# Patient Record
Sex: FEMALE | Race: WHITE | Employment: UNEMPLOYED | URBAN - METROPOLITAN AREA
[De-identification: names, ages, dates, MRNs, and addresses within clinical notes are randomized per-mention and may not be internally consistent; named-entity substitution may affect disease eponyms.]

---

## 2020-06-25 ENCOUNTER — TELEPHONE (OUTPATIENT)
Dept: GYNECOLOGY | Age: 25
End: 2020-06-25

## 2020-06-25 ENCOUNTER — APPOINTMENT (OUTPATIENT)
Dept: ULTRASOUND IMAGING | Age: 25
End: 2020-06-25
Payer: MEDICAID

## 2020-06-25 ENCOUNTER — APPOINTMENT (OUTPATIENT)
Dept: GENERAL RADIOLOGY | Age: 25
End: 2020-06-25
Payer: MEDICAID

## 2020-06-25 ENCOUNTER — HOSPITAL ENCOUNTER (OUTPATIENT)
Age: 25
Setting detail: OBSERVATION
Discharge: LEFT BEFORE DISCHARGE | End: 2020-06-25
Attending: EMERGENCY MEDICINE | Admitting: PEDIATRICS
Payer: MEDICAID

## 2020-06-25 VITALS
DIASTOLIC BLOOD PRESSURE: 56 MMHG | HEIGHT: 65 IN | SYSTOLIC BLOOD PRESSURE: 104 MMHG | WEIGHT: 167.11 LBS | OXYGEN SATURATION: 97 % | BODY MASS INDEX: 27.84 KG/M2 | TEMPERATURE: 98.1 F | RESPIRATION RATE: 12 BRPM | HEART RATE: 79 BPM

## 2020-06-25 PROBLEM — R11.2 INTRACTABLE VOMITING WITH NAUSEA: Status: ACTIVE | Noted: 2020-06-25

## 2020-06-25 LAB
A/G RATIO: 1.3 (ref 1.1–2.2)
ACETAMINOPHEN LEVEL: <5 UG/ML (ref 10–30)
ALBUMIN SERPL-MCNC: 3.9 G/DL (ref 3.4–5)
ALP BLD-CCNC: 116 U/L (ref 40–129)
ALT SERPL-CCNC: 162 U/L (ref 10–40)
AMMONIA: 45 UMOL/L (ref 11–51)
AMPHETAMINE SCREEN, URINE: ABNORMAL
ANION GAP SERPL CALCULATED.3IONS-SCNC: 16 MMOL/L (ref 3–16)
AST SERPL-CCNC: 81 U/L (ref 15–37)
BACTERIA WET PREP: ABNORMAL
BACTERIA: ABNORMAL /HPF
BARBITURATE SCREEN URINE: ABNORMAL
BASOPHILS ABSOLUTE: 0.1 K/UL (ref 0–0.2)
BASOPHILS RELATIVE PERCENT: 0.6 %
BENZODIAZEPINE SCREEN, URINE: POSITIVE
BILIRUB SERPL-MCNC: 1.8 MG/DL (ref 0–1)
BILIRUBIN DIRECT: 0.7 MG/DL (ref 0–0.3)
BILIRUBIN URINE: ABNORMAL
BLOOD, URINE: NEGATIVE
BUN BLDV-MCNC: 6 MG/DL (ref 7–20)
CALCIUM SERPL-MCNC: 9.6 MG/DL (ref 8.3–10.6)
CANNABINOID SCREEN URINE: ABNORMAL
CHLORIDE BLD-SCNC: 85 MMOL/L (ref 99–110)
CLARITY: ABNORMAL
CLUE CELLS: ABNORMAL
CO2: 27 MMOL/L (ref 21–32)
COCAINE METABOLITE SCREEN URINE: ABNORMAL
COLOR: ABNORMAL
COMMENT UA: ABNORMAL
CREAT SERPL-MCNC: <0.5 MG/DL (ref 0.6–1.1)
EKG ATRIAL RATE: 89 BPM
EKG DIAGNOSIS: NORMAL
EKG P AXIS: 62 DEGREES
EKG P-R INTERVAL: 138 MS
EKG Q-T INTERVAL: 416 MS
EKG QRS DURATION: 88 MS
EKG QTC CALCULATION (BAZETT): 506 MS
EKG R AXIS: 70 DEGREES
EKG T AXIS: 33 DEGREES
EKG VENTRICULAR RATE: 89 BPM
EOSINOPHILS ABSOLUTE: 0 K/UL (ref 0–0.6)
EOSINOPHILS RELATIVE PERCENT: 0.3 %
EPITHELIAL CELLS WET PREP: ABNORMAL
EPITHELIAL CELLS, UA: 4 /HPF (ref 0–5)
ETHANOL: NORMAL MG/DL (ref 0–0.08)
GFR AFRICAN AMERICAN: >60
GFR NON-AFRICAN AMERICAN: >60
GLOBULIN: 3.1 G/DL
GLUCOSE BLD-MCNC: 136 MG/DL (ref 70–99)
GLUCOSE URINE: 100 MG/DL
GONADOTROPIN, CHORIONIC (HCG) QUANT: NORMAL MIU/ML
HCG QUALITATIVE: POSITIVE
HCT VFR BLD CALC: 39.3 % (ref 36–48)
HEMOGLOBIN: 14.1 G/DL (ref 12–16)
HYALINE CASTS: ABNORMAL /LPF (ref 0–2)
KETONES, URINE: 15 MG/DL
LACTIC ACID: 2.4 MMOL/L (ref 0.4–2)
LEUKOCYTE ESTERASE, URINE: ABNORMAL
LIPASE: 13 U/L (ref 13–60)
LYMPHOCYTES ABSOLUTE: 1.7 K/UL (ref 1–5.1)
LYMPHOCYTES RELATIVE PERCENT: 18.1 %
Lab: ABNORMAL
MAGNESIUM: 1.9 MG/DL (ref 1.8–2.4)
MCH RBC QN AUTO: 30.4 PG (ref 26–34)
MCHC RBC AUTO-ENTMCNC: 36 G/DL (ref 31–36)
MCV RBC AUTO: 84.6 FL (ref 80–100)
METHADONE SCREEN, URINE: ABNORMAL
MICROSCOPIC EXAMINATION: YES
MONOCYTES ABSOLUTE: 0.8 K/UL (ref 0–1.3)
MONOCYTES RELATIVE PERCENT: 8.3 %
MUCUS: ABNORMAL /LPF
NEUTROPHILS ABSOLUTE: 6.7 K/UL (ref 1.7–7.7)
NEUTROPHILS RELATIVE PERCENT: 72.7 %
NITRITE, URINE: POSITIVE
OPIATE SCREEN URINE: ABNORMAL
OXYCODONE URINE: ABNORMAL
PDW BLD-RTO: 15 % (ref 12.4–15.4)
PH UA: 6
PH UA: 6 (ref 5–8)
PHENCYCLIDINE SCREEN URINE: ABNORMAL
PHOSPHORUS: 3.2 MG/DL (ref 2.5–4.9)
PLATELET # BLD: 234 K/UL (ref 135–450)
PMV BLD AUTO: 8.9 FL (ref 5–10.5)
POTASSIUM REFLEX MAGNESIUM: 2.4 MMOL/L (ref 3.5–5.1)
PROPOXYPHENE SCREEN: ABNORMAL
PROTEIN UA: 100 MG/DL
RBC # BLD: 4.64 M/UL (ref 4–5.2)
RBC UA: 2 /HPF (ref 0–4)
RBC WET PREP: ABNORMAL
SALICYLATE, SERUM: <0.3 MG/DL (ref 15–30)
SODIUM BLD-SCNC: 128 MMOL/L (ref 136–145)
SOURCE WET PREP: ABNORMAL
SPECIFIC GRAVITY UA: >1.03 (ref 1–1.03)
TOTAL PROTEIN: 7 G/DL (ref 6.4–8.2)
TRICHOMONAS PREP: ABNORMAL
URINE TYPE: ABNORMAL
UROBILINOGEN, URINE: 4 E.U./DL
WBC # BLD: 9.2 K/UL (ref 4–11)
WBC UA: 56 /HPF (ref 0–5)
WBC WET PREP: ABNORMAL
YEAST WET PREP: ABNORMAL

## 2020-06-25 PROCEDURE — 82140 ASSAY OF AMMONIA: CPT

## 2020-06-25 PROCEDURE — 6360000002 HC RX W HCPCS: Performed by: OBSTETRICS & GYNECOLOGY

## 2020-06-25 PROCEDURE — G0378 HOSPITAL OBSERVATION PER HR: HCPCS

## 2020-06-25 PROCEDURE — 83690 ASSAY OF LIPASE: CPT

## 2020-06-25 PROCEDURE — 80053 COMPREHEN METABOLIC PANEL: CPT

## 2020-06-25 PROCEDURE — 83735 ASSAY OF MAGNESIUM: CPT

## 2020-06-25 PROCEDURE — 2580000003 HC RX 258: Performed by: OBSTETRICS & GYNECOLOGY

## 2020-06-25 PROCEDURE — 87491 CHLMYD TRACH DNA AMP PROBE: CPT

## 2020-06-25 PROCEDURE — 2580000003 HC RX 258: Performed by: EMERGENCY MEDICINE

## 2020-06-25 PROCEDURE — 84703 CHORIONIC GONADOTROPIN ASSAY: CPT

## 2020-06-25 PROCEDURE — G0480 DRUG TEST DEF 1-7 CLASSES: HCPCS

## 2020-06-25 PROCEDURE — 2580000003 HC RX 258: Performed by: HOSPITALIST

## 2020-06-25 PROCEDURE — 2500000003 HC RX 250 WO HCPCS: Performed by: OBSTETRICS & GYNECOLOGY

## 2020-06-25 PROCEDURE — 87040 BLOOD CULTURE FOR BACTERIA: CPT

## 2020-06-25 PROCEDURE — 6360000002 HC RX W HCPCS: Performed by: HOSPITALIST

## 2020-06-25 PROCEDURE — 6360000002 HC RX W HCPCS: Performed by: PEDIATRICS

## 2020-06-25 PROCEDURE — 96365 THER/PROPH/DIAG IV INF INIT: CPT

## 2020-06-25 PROCEDURE — 99221 1ST HOSP IP/OBS SF/LOW 40: CPT | Performed by: OBSTETRICS & GYNECOLOGY

## 2020-06-25 PROCEDURE — 6360000002 HC RX W HCPCS: Performed by: EMERGENCY MEDICINE

## 2020-06-25 PROCEDURE — 2500000003 HC RX 250 WO HCPCS: Performed by: EMERGENCY MEDICINE

## 2020-06-25 PROCEDURE — 84702 CHORIONIC GONADOTROPIN TEST: CPT

## 2020-06-25 PROCEDURE — 71045 X-RAY EXAM CHEST 1 VIEW: CPT

## 2020-06-25 PROCEDURE — 84100 ASSAY OF PHOSPHORUS: CPT

## 2020-06-25 PROCEDURE — 99285 EMERGENCY DEPT VISIT HI MDM: CPT

## 2020-06-25 PROCEDURE — 96375 TX/PRO/DX INJ NEW DRUG ADDON: CPT

## 2020-06-25 PROCEDURE — 80074 ACUTE HEPATITIS PANEL: CPT

## 2020-06-25 PROCEDURE — 85025 COMPLETE CBC W/AUTO DIFF WBC: CPT

## 2020-06-25 PROCEDURE — 96367 TX/PROPH/DG ADDL SEQ IV INF: CPT

## 2020-06-25 PROCEDURE — 87210 SMEAR WET MOUNT SALINE/INK: CPT

## 2020-06-25 PROCEDURE — 94760 N-INVAS EAR/PLS OXIMETRY 1: CPT

## 2020-06-25 PROCEDURE — 96361 HYDRATE IV INFUSION ADD-ON: CPT

## 2020-06-25 PROCEDURE — 80307 DRUG TEST PRSMV CHEM ANLYZR: CPT

## 2020-06-25 PROCEDURE — 93005 ELECTROCARDIOGRAM TRACING: CPT | Performed by: EMERGENCY MEDICINE

## 2020-06-25 PROCEDURE — 36415 COLL VENOUS BLD VENIPUNCTURE: CPT

## 2020-06-25 PROCEDURE — 81001 URINALYSIS AUTO W/SCOPE: CPT

## 2020-06-25 PROCEDURE — 93010 ELECTROCARDIOGRAM REPORT: CPT | Performed by: INTERNAL MEDICINE

## 2020-06-25 PROCEDURE — 83605 ASSAY OF LACTIC ACID: CPT

## 2020-06-25 PROCEDURE — 87591 N.GONORRHOEAE DNA AMP PROB: CPT

## 2020-06-25 PROCEDURE — 76801 OB US < 14 WKS SINGLE FETUS: CPT

## 2020-06-25 PROCEDURE — 82248 BILIRUBIN DIRECT: CPT

## 2020-06-25 RX ORDER — ONDANSETRON 2 MG/ML
4 INJECTION INTRAMUSCULAR; INTRAVENOUS EVERY 6 HOURS PRN
Status: DISCONTINUED | OUTPATIENT
Start: 2020-06-25 | End: 2020-06-25 | Stop reason: HOSPADM

## 2020-06-25 RX ORDER — POTASSIUM CHLORIDE 7.45 MG/ML
10 INJECTION INTRAVENOUS ONCE
Status: COMPLETED | OUTPATIENT
Start: 2020-06-25 | End: 2020-06-25

## 2020-06-25 RX ORDER — 0.9 % SODIUM CHLORIDE 0.9 %
1500 INTRAVENOUS SOLUTION INTRAVENOUS ONCE
Status: COMPLETED | OUTPATIENT
Start: 2020-06-25 | End: 2020-06-25

## 2020-06-25 RX ORDER — ACETAMINOPHEN 325 MG/1
650 TABLET ORAL EVERY 6 HOURS PRN
Status: DISCONTINUED | OUTPATIENT
Start: 2020-06-25 | End: 2020-06-25 | Stop reason: HOSPADM

## 2020-06-25 RX ORDER — ONDANSETRON 4 MG/1
4 TABLET, ORALLY DISINTEGRATING ORAL EVERY 8 HOURS PRN
Status: DISCONTINUED | OUTPATIENT
Start: 2020-06-25 | End: 2020-06-25 | Stop reason: HOSPADM

## 2020-06-25 RX ORDER — SODIUM CHLORIDE AND POTASSIUM CHLORIDE .9; .15 G/100ML; G/100ML
SOLUTION INTRAVENOUS CONTINUOUS
Status: DISCONTINUED | OUTPATIENT
Start: 2020-06-25 | End: 2020-06-25 | Stop reason: HOSPADM

## 2020-06-25 RX ORDER — POTASSIUM CHLORIDE 7.45 MG/ML
10 INJECTION INTRAVENOUS PRN
Status: DISCONTINUED | OUTPATIENT
Start: 2020-06-25 | End: 2020-06-25 | Stop reason: HOSPADM

## 2020-06-25 RX ORDER — 0.9 % SODIUM CHLORIDE 0.9 %
1000 INTRAVENOUS SOLUTION INTRAVENOUS ONCE
Status: COMPLETED | OUTPATIENT
Start: 2020-06-25 | End: 2020-06-25

## 2020-06-25 RX ORDER — MAGNESIUM SULFATE IN WATER 40 MG/ML
2 INJECTION, SOLUTION INTRAVENOUS PRN
Status: DISCONTINUED | OUTPATIENT
Start: 2020-06-25 | End: 2020-06-25 | Stop reason: HOSPADM

## 2020-06-25 RX ORDER — SODIUM CHLORIDE 0.9 % (FLUSH) 0.9 %
10 SYRINGE (ML) INJECTION PRN
Status: DISCONTINUED | OUTPATIENT
Start: 2020-06-25 | End: 2020-06-25 | Stop reason: HOSPADM

## 2020-06-25 RX ORDER — POLYETHYLENE GLYCOL 3350 17 G/17G
17 POWDER, FOR SOLUTION ORAL DAILY PRN
Status: DISCONTINUED | OUTPATIENT
Start: 2020-06-25 | End: 2020-06-25 | Stop reason: HOSPADM

## 2020-06-25 RX ORDER — SODIUM CHLORIDE 0.9 % (FLUSH) 0.9 %
10 SYRINGE (ML) INJECTION EVERY 12 HOURS SCHEDULED
Status: DISCONTINUED | OUTPATIENT
Start: 2020-06-25 | End: 2020-06-25 | Stop reason: HOSPADM

## 2020-06-25 RX ORDER — POTASSIUM CHLORIDE 20 MEQ/1
40 TABLET, EXTENDED RELEASE ORAL PRN
Status: DISCONTINUED | OUTPATIENT
Start: 2020-06-25 | End: 2020-06-25 | Stop reason: HOSPADM

## 2020-06-25 RX ORDER — ACETAMINOPHEN 650 MG/1
650 SUPPOSITORY RECTAL EVERY 6 HOURS PRN
Status: DISCONTINUED | OUTPATIENT
Start: 2020-06-25 | End: 2020-06-25 | Stop reason: HOSPADM

## 2020-06-25 RX ORDER — ONDANSETRON 2 MG/ML
4 INJECTION INTRAMUSCULAR; INTRAVENOUS ONCE
Status: COMPLETED | OUTPATIENT
Start: 2020-06-25 | End: 2020-06-25

## 2020-06-25 RX ADMIN — POTASSIUM CHLORIDE AND SODIUM CHLORIDE: 900; 150 INJECTION, SOLUTION INTRAVENOUS at 10:03

## 2020-06-25 RX ADMIN — CEFTRIAXONE 1 G: 1 INJECTION, POWDER, FOR SOLUTION INTRAMUSCULAR; INTRAVENOUS at 16:51

## 2020-06-25 RX ADMIN — FAMOTIDINE 20 MG: 10 INJECTION, SOLUTION INTRAVENOUS at 07:41

## 2020-06-25 RX ADMIN — SODIUM CHLORIDE 1000 ML: 9 INJECTION, SOLUTION INTRAVENOUS at 05:32

## 2020-06-25 RX ADMIN — POTASSIUM CHLORIDE 10 MEQ: 7.46 INJECTION, SOLUTION INTRAVENOUS at 05:56

## 2020-06-25 RX ADMIN — ONDANSETRON 4 MG: 2 INJECTION INTRAMUSCULAR; INTRAVENOUS at 05:48

## 2020-06-25 RX ADMIN — ASCORBIC ACID, VITAMIN A PALMITATE, CHOLECALCIFEROL, THIAMINE HYDROCHLORIDE, RIBOFLAVIN-5 PHOSPHATE SODIUM, PYRIDOXINE HYDROCHLORIDE, NIACINAMIDE, DEXPANTHENOL, ALPHA-TOCOPHEROL ACETATE, VITAMIN K1, FOLIC ACID, BIOTIN, CYANOCOBALAMIN: 200; 3300; 200; 6; 3.6; 6; 40; 15; 10; 150; 600; 60; 5 INJECTION, SOLUTION INTRAVENOUS at 19:03

## 2020-06-25 RX ADMIN — SODIUM CHLORIDE 1500 ML: 9 INJECTION, SOLUTION INTRAVENOUS at 05:33

## 2020-06-25 NOTE — CARE COORDINATION
INITIAL CASE MANAGEMENT ASSESSMENT    Reviewed chart, met with patient to assess possible discharge needs. Explained Case Management role/services. Living Situation: Patient lives with her step mother Krystyna Saravia. Confirmed home address    ADLs: Independent but reports weakness due to feeling ill     DME: None used    PT/OT Recs: none ordered     Active Services: None     Transportation: Has not driven recently. Reports step mother will transport home at discharge. Medications: No barriers to taking/obtaining medications. PCP: None. List of PCPs and community clinics given. HD/PD: N/A    Substance Use: Substance use discussed with patient. She reports she has not been active with any treatment recently but is interested in getting back on suboxone. Offered list of local treatment centers but denied need. She reports she has a list and is familiar with locations. Continued to deny SW assistance. PLAN/COMMENTS: Denied treatment resources. PCP/clinic list given. OB consulted for pregnancy    SW/CM provided contact information for patient or family to call with any questions. SW/CM will follow and assist as needed.     Electronically signed by ROBERT Mejía on 6/25/2020 at 1:57 PM

## 2020-06-25 NOTE — ED PROVIDER NOTES
Emergency Physician Note    Chief Complaint  Fatigue (x 1 month, not much intake of food, slow to respond) and Hematemesis ( x 3-4 days)       History of Present Illness  Adeola Laguna is a 23 y.o. female who presents to the ED for Lucille hematic emesis. Patient states for the past month \"stomach issues\", when I asked her what it is about she says because she has not taken any food for such a long time her stomach feels like it is always in hunger pains and it makes her want her eat less. Patient is a daily heroin user, she snorts it only has not been using IV drug use. Mother went to see her today saw that she was very slow to respond and fatigued and therefore brought her to the ER for further evaluation. Patient also reports for the past 3 or 4 days she has had multiple episodes of vomiting in the last few vomiting episodes had streaks of blood in it. She denies any chest pain or throat pain. Denies fever, chills, malaise, chest pain, shortness of breath, cough, diarrhea, headache, sore throat, dysuria, back pain, rash. No palliative/provocative factors. Unless otherwise stated in this report or unable to obtain because of the patient's clinical or mental status as evidenced by the medical record, this patient's positive and negative responses for review of systems, constitutional, psych, eyes, ENT, cardiovascular, respiratory, gastrointestinal, neurological, genitourinary, musculoskeletal, integument systems and systems related to the presenting problem are either stated in the preceding paragraph or were not pertinent or were negative for the symptoms and/or complaints related to the medical problem. I have reviewed the following from the nursing documentation:      Prior to Admission medications    Not on File       Allergies as of 06/25/2020    (Not on File)       No past medical history on file. Surgical History: No past surgical history on file.      Family History:  No family exudates,   no airway obstruction, moist mucous membranes. Uvula was midline and has symmetric rise. EYES:    Conjunctiva mild scleral icterus,   Pupils equal round and reactive to light,   Extraocular movements normal.  NECK:    Trachea is midline. No stridor. CHEST:    Regular rate and regular rhythm, no murmurs/rubs/gallops,   normal S1/S2, chest wall non-tender. LUNGS:    No respiratory distress. No abdominal retractions, no sternal retractions. Clear to auscultation bilaterally, no wheezing, no rhochi, no rales  ABDOMEN:    Soft, diffuse abdominal tenderness, distended. No guarding. No rebound. No costovertebral angle tenderness to palpation. Normal BS, no organomegaly, no abdominal masses  EXTREMITIES:   Moves extremities x4 with purpose. Normal range of motion, no edema,   no tenderness, no deformity,   distal pulses present and equal bilaterally. SKIN:    Warm, dry and intact. NEUROLOGIC:  Normal mental status. Moving all extremities to command. Alert and oriented x4   Generalized weakness without focal motor deficit or gross sensory deficit. Normal speech. PSYCHIATRIC:  Not anxious,   normal mood and affect,   thoughts are linear and organized,   without delusions/hallucinations,   responds appropriately to questions    LABS and DIAGNOSTIC RESULTS  EKG  The Ekg interpreted by me shows  normal sinus rhythm with a rate of 89  Axis is   Normal  QTc is  Prolonged at 506 ms  Intervals and Durations are unremarkable. ST Segments: depression in  multiple  Delta waves, Brugada Syndrome, and Short [place] are not present. Prior EKG to compare with was not available. RADIOLOGY  X-RAYS:  I have reviewed radiologic plain film image(s). ALL OTHER NON-PLAIN FILM IMAGES SUCH AS CT, ULTRASOUND AND MRI HAVE BEEN READ BY THE RADIOLOGIST.   XR CHEST PORTABLE   Final Result   Negative chest.              Chest X-Ray    Interpreted by: Emergency Department Physician    View: Portable chest xray    Findings: Normal heart size, Normal lungs, Normal mediastinum, No infiltrates, No hemothorax, No pneumothorax    LABS  No results found for this visit on 06/25/20. PROCEDURES  ULTRASOUND - OB  Ultrasound probe was used to evaluate the fetus. There is positive fetal movement, anterior placenta, easily discernible all 4 extremities and easily able to visualize the feet and hands, fetus was very active within the wound, positive  bpm. Patient tolerated the procedure well. MEDICAL DECISION MAKING    Procedures/interventions/images ordered for this visit  Orders Placed This Encounter   Procedures    Culture, Blood 1    Culture, Blood 2    XR CHEST STANDARD (2 VW)    Urinalysis, reflex to microscopic    CBC Auto Differential    Comprehensive Metabolic Panel w/ Reflex to MG    Lipase    HCG Qualitative, Serum    Urine Drug Screen    Lactic Acid, Plasma    Ammonia    Ethanol    Acetaminophen level    Salicylate    Orthostatic blood pressure and pulse    Initiate Oxygen Therapy Protocol    EKG 12 lead    Saline lock IV       Medications ordered for this visit  Orders Placed This Encounter   Medications    0.9 % sodium chloride bolus    0.9 % sodium chloride IV bolus 1,500 mL       ED course notes for this visit  ED Course as of Jun 25 0548   Thu Jun 25, 2020   0547 I went back into the room and discussed the positive pregnancy test with the patient. She recalls her last menstrual period was in the beginning of April but she cannot give me more specific date than that. She states in regards to the abdominal pain she has had intermittent episodes of sharpness in the lower abdomen otherwise just feels like hunger pains. She also reports intermittent white discharge from her vagina but no vaginal bleeding. Patient is G3, P211 (a 3year-old child and one stillborn birth).     [SG]      ED Course User Index  [SG] Nichole King MD       I wore goggles, surgical 0.0 - 0.3 mg/dL   Microscopic Urinalysis   Result Value Ref Range    Hyaline Casts, UA 3-5 (A) 0 - 2 /LPF    Mucus, UA 2+ (A) None Seen /LPF    Bacteria, UA 1+ (A) None Seen /HPF    Urinalysis Comments see below     WBC, UA 56 (H) 0 - 5 /HPF    RBC, UA 2 0 - 4 /HPF    Epithelial Cells, UA 4 0 - 5 /HPF   Phosphorus   Result Value Ref Range    Phosphorus 3.2 2.5 - 4.9 mg/dL   EKG 12 lead   Result Value Ref Range    Ventricular Rate 89 BPM    Atrial Rate 89 BPM    P-R Interval 138 ms    QRS Duration 88 ms    Q-T Interval 416 ms    QTc Calculation (Bazett) 506 ms    P Axis 62 degrees    R Axis 70 degrees    T Axis 33 degrees    Diagnosis       Normal sinus rhythmST & T wave abnormality, consider inferolateral ischemiaBorderline QT intervalAbnormal ECGNo previous ECGs availableConfirmed by [facility] Serge GUERRA ([id number]) on 6/25/2020 8:48:03 AM       I spoke with Dr. Renee Hobbs. We thoroughly discussed the history, physical exam, laboratory and imaging studies, as well as, emergency department course. Based upon that discussion, we've decided to admit Hemanth Fitzpatrick for further observation and evaluation of Cate Isbell's hypokalemia, abnormal EKG, elevated LFTs. As I have deemed necessary from their history, physical, and studies, I have considered and evaluated Hemanth Fitzpatrick for the following diagnoses:     pneumonia, UTI, meningitis, Guillain-Barryton, Lambert-Eaton, myasthenia gravis, or Tick-Borne disease, acute coronary syndrome, pulmonary embolism, toxicity, shock, sepsis, unstable arrhythmia, hemodynamic or cardiopulmonary instability, severe anemia,  ACUTE APPENDICITIS, BOWEL OBSTRUCTION, CHOLECYSTITIS, DIVERTICULITIS, INCARCERATED HERNIA, PANCREATITIS,  PERFORATED BOWEL,  ectopic pregnancy      FINAL IMPRESSION  1. Hypokalemia    2. Abnormal EKG    3. QT prolongation    4. Lower abdominal pain    5. Pregnancy as incidental finding    6.  Heroin abuse affecting pregnancy in first trimester ([address]    7. Elevated LFTs Vitals:  Blood pressure 134/76, pulse 84, temperature 97.9 °F (36.6 °C), temperature source Oral, resp. rate 19, height 5' 5\" (1.651 m), weight 158 lb 11.7 oz (72 kg), SpO2 97 %. Disposition    Pt is in stable condition upon Admit to med/surg floor. Please note, critical care time was at least 25 minutes, obtaining history, conducting a physical exam, performing and monitoring interventions, ordering, collecting and interpreting tests, and establishing medical decision-making and discussion with the patient and/or family, specifically for management of the presenting complaint and symptoms initially, direct bedside care, reevaluation, review of records, and consultation. There was a high probability of clinically significant life-threatening deterioration in the patient's condition, which required my urgent intervention. This time does not include separately billable procedures. The note was completed using Dragon voice recognition transcription. Every effort was made to ensure accuracy; however, inadvertent transcription errors may be present despite my best efforts to edit errors.     Chun London MD  [address] [place] [address]        Chun London MD  06/26/20 1044

## 2020-06-25 NOTE — ED NOTES
Report called to Linda RN   To Room 9519  Cardiac monitor on during transfer     VSS, Afebrile   IV site is clean, dry and intact, Normal saline locked            [facility]  06/25/20 4752

## 2020-06-25 NOTE — H&P
Hospital Medicine History & Physical      PCP: No primary care provider on file. Date of Admission: 2020    Date of Service: Pt seen/examined on 2020    Chief Complaint: Fatigue, intractable vomiting      History Of Present Illness:    23 y.o. female who abuses heroin by snorting it presents with 1 month of intractable vomiting associated with fatigue and 3 to 4 days of blood-streaked emesis. Last known menstrual cycle was the end of April. She engages in unprotected sex. Denies IV drug use. States that she has been unable to keep anything down and has multiple episodes of emesis in a day. Reports urinary urgency with dysuria. Past Medical History:    Heroin abuse    Past Surgical History:        Medications Prior to Admission:   Prior to Admission medications    Not on File       Allergies:  Patient has no allergy information on record. Social History:      The patient currently lives at home with her mom and stepmom    TOBACCO: Rarely  ETOH: Occasional  DRUGS: Snorts heroin      Family History:      Positive as follows:    No family history on file. REVIEW OF SYSTEMS:   Pertinent positives as noted in the HPI. All other systems reviewed and negative. PHYSICAL EXAM PERFORMED:    /76   Pulse 84   Temp 97.9 °F (36.6 °C) (Oral)   Resp 19   Ht 5' 5\" (1.651 m)   Wt 158 lb 11.7 oz (72 kg)   SpO2 97%   BMI 26.41 kg/m²     General appearance:  No apparent distress, appears stated age and cooperative. HEENT:  Normal cephalic, atraumatic without obvious deformity. Pupils equal, round, and reactive to light. Extra ocular muscles intact. Conjunctivae/corneas clear. Dry mucous membranes  Neck: Supple, with full range of motion. No jugular venous distention. Trachea midline. Respiratory:  Normal respiratory effort. Clear to auscultation, bilaterally without Rales/Wheezes/Rhonchi.   Cardiovascular:  Regular rate and rhythm with normal S1/S2 without murmurs, rubs or

## 2020-06-26 LAB
C TRACH DNA GENITAL QL NAA+PROBE: NEGATIVE
HAV IGM SER IA-ACNC: ABNORMAL
HEPATITIS B CORE IGM ANTIBODY: ABNORMAL
HEPATITIS B SURFACE ANTIGEN INTERPRETATION: ABNORMAL
HEPATITIS C ANTIBODY INTERPRETATION: REACTIVE
N. GONORRHOEAE DNA: NEGATIVE

## 2020-06-26 NOTE — PROGRESS NOTES
Patient calls nurse to bedside and states her mother is having emergency surgery and she needs to leave immediately. RN notifies patient of risks of leaving up to and including permanent disability and death. Patient verbalizes understanding of returning for worsening signs and symptoms as well as seeking medical care for follow up as discussed with primary admitting team upon admission. IV removed. Charge Nurse at bedside and NP aware of patient status. Patient is AOx4 and in no acute distress at departure.

## 2020-06-26 NOTE — PROGRESS NOTES
Notified by Pt's nurse that she has signed out against medical advice    Pt has normal mental status and adequate capacity to make medical decisions. The risks have been explained to the patient, including worsening illness, chronic pain, permanent disability, and death. The benefits of admission have also been explained, including the availability and proximity of nurses, physicians, monitoring, diagnostic testing, and treatment. The patient was able to understand and state the risks and benefits of hospital admission. This was witnessed by the bedside nurse [facility]    The patient had the opportunity to ask questions about her medical condition. The patient was treated to the extent that she would allow, and knows that He/she may return for care at any time. Instructed patient to follow up with OB/GYN for further obstetrical care.     [address], APRN - CNP

## 2020-06-28 ASSESSMENT — ENCOUNTER SYMPTOMS: ABDOMINAL PAIN: 0

## 2020-06-28 NOTE — PROGRESS NOTES
file     Gets together: Not on file     Attends Worship service: Not on file     Active member of club or organization: Not on file     Attends meetings of clubs or organizations: Not on file     Relationship status: Not on file    Intimate partner violence     Fear of current or ex partner: Not on file     Emotionally abused: Not on file     Physically abused: Not on file     Forced sexual activity: Not on file   Other Topics Concern    Not on file   Social History Narrative    Not on file     No Known Allergies  No outpatient medications have been marked as taking for the 6/25/20 encounter [facility] Encounter). No family history on file.   BP (!) 104/56   Pulse 79   Temp 98.1 °F (36.7 °C) (Oral)   Resp 12   Ht 5' 5\" (1.651 m)   Wt 167 lb 1.7 oz (75.8 kg)   SpO2 97%   BMI 27.81 kg/m²     WDWN in NAD  A and O x 3  HEENT-EOMI, mmm  ABD-soft, NT, ND, no hsm  EXT-no c/c/e, no cords, NT  Neuro-grossly intact  Skin-warm and dry    , K  2.4    US OB LESS THAN 14 WEEKS SINGLE OR FIRST GESTATION   Status: Final result   Order Providers     Authorizing Billing   MD Leslie Oh MD          Signed by     Signed Date/Time  Phone Pager   Derrek Zelaya 6/27/2020 18:31 [phone]    Reading Providers     Read Date Phone Pager   Derrek Zelaya Jun 25, 2020 [phone]    Routing History     Priority Sent On From To Message Type    6/27/2020  6:34 PM Kiko, Swoh Incoming Radiology Results From [address] [place] [address] Radiology F/U Results    6/26/2020  2:05 PM Swoh Incoming Lab Results From Soft (Jono Regalado) Niki Cortez MD Results    6/26/2020 11:55 AM Swoh Incoming Lab Results From Soft (Artur Adt) Blayne Rooney Ed Provider Pool Results   Radiation Dose Estimates     No radiation information found for this patient   Findings     No data filed   Narrative   EXAMINATION:   FIRST TRIMESTER OBSTETRIC ULTRASOUND       6/25/2020       TECHNIQUE:   Transabdominal and transvaginal first trimester obstetric pelvic ultrasound   was performed with color Doppler flow evaluation.       COMPARISON:   None       HISTORY:   ORDERING SYSTEM PROVIDED HISTORY: pregnancy   TECHNOLOGIST PROVIDED HISTORY:   Reason for exam:->pregnancy       FINDINGS:   Uterus: Uterus measures 10.5 x 10.1 x 9.0 cm.       Gestational Sac(s):  Single normal appearing gestational sac.  No evidence of   subchorionic hemorrhage.       Yolk Sac:  Not visualized.       Fetal Pole:  Single fetal pole       Crown Rump Length:  5.8 cm.       Fetal Heart Rate:  153 beats per minute.       Right ovary: Right ovary measures 3.5 x 2.3 x 1.6 cm.  Doppler signal was   elicited from the right ovary.       Left ovary: Left ovary measures 2.6 x 2.7 x 1.8 cm.  Doppler signal was   elicited from the left ovary.       Free fluid: No free pelvic fluid is identified.       Measurements:       Estimated gestational age by current ultrasound: 12 weeks 3 days +/- 1 week.       Estimated gestational by LMP/prior ultrasound: 12 weeks 1 day.  Estimated   date of delivery as determined by LMP is 1/6/2021.       Estimated Due Date: Estimated date of delivery as determined ultrasound is   1/4/2021.           Impression   Single live intrauterine pregnancy with estimated gestational age as   determined by ultrasound of 12 weeks 3 days +/- 1 week.  Estimated date of   delivery as determined ultrasound is 1/4/2021.           Order History     Open Order Details   PACS Images      Show images for US OB LESS THAN 14 WEEKS SINGLE OR FIRST GESTATION   Results History Report     View Report   External Result Report     External Result Report   Existing Charges      Charge Line Charge Status Charge Trigger Charge Type   [id number] FETAL EVAL 1ST TRI SGL GEST Filed [facility] Billing Imaging end exam Technical   [id number], PREG UTER,FETAL & MAT,1ST TRIMEST Deleted Imaging result study Professional   Order Report      Order Details     Plan-patient is

## 2020-06-29 LAB
BLOOD CULTURE, ROUTINE: NORMAL
CULTURE, BLOOD 2: NORMAL

## 2020-12-07 ENCOUNTER — ANESTHESIA EVENT (OUTPATIENT)
Dept: LABOR AND DELIVERY | Age: 25
DRG: 540 | End: 2020-12-07
Payer: MEDICAID

## 2020-12-07 ENCOUNTER — HOSPITAL ENCOUNTER (INPATIENT)
Age: 25
LOS: 3 days | Discharge: OTHER INSTITUTION | DRG: 540 | End: 2020-12-10
Attending: OBSTETRICS & GYNECOLOGY | Admitting: OBSTETRICS & GYNECOLOGY
Payer: MEDICAID

## 2020-12-07 ENCOUNTER — ANESTHESIA (OUTPATIENT)
Dept: LABOR AND DELIVERY | Age: 25
DRG: 540 | End: 2020-12-07
Payer: MEDICAID

## 2020-12-07 VITALS
SYSTOLIC BLOOD PRESSURE: 122 MMHG | DIASTOLIC BLOOD PRESSURE: 74 MMHG | OXYGEN SATURATION: 100 % | RESPIRATION RATE: 27 BRPM

## 2020-12-07 PROBLEM — Z37.9 NORMAL LABOR: Status: ACTIVE | Noted: 2020-12-07

## 2020-12-07 LAB
ABO/RH: NORMAL
ABO/RH: NORMAL
AMPHETAMINE SCREEN, URINE: POSITIVE
ANION GAP SERPL CALCULATED.3IONS-SCNC: 12 MMOL/L (ref 3–16)
ANTIBODY SCREEN: NORMAL
BARBITURATE SCREEN URINE: ABNORMAL
BASOPHILS ABSOLUTE: 0 K/UL (ref 0–0.2)
BASOPHILS RELATIVE PERCENT: 0.2 %
BENZODIAZEPINE SCREEN, URINE: ABNORMAL
BILIRUBIN URINE: NEGATIVE
BLOOD, URINE: ABNORMAL
BUN BLDV-MCNC: 9 MG/DL (ref 7–20)
BUPRENORPHINE URINE: ABNORMAL
CALCIUM SERPL-MCNC: 8.8 MG/DL (ref 8.3–10.6)
CANNABINOID SCREEN URINE: ABNORMAL
CHLORIDE BLD-SCNC: 100 MMOL/L (ref 99–110)
CLARITY: ABNORMAL
CO2: 22 MMOL/L (ref 21–32)
COCAINE METABOLITE SCREEN URINE: ABNORMAL
COLOR: ABNORMAL
COMMENT UA: ABNORMAL
CREAT SERPL-MCNC: 0.6 MG/DL (ref 0.6–1.1)
EOSINOPHILS ABSOLUTE: 0 K/UL (ref 0–0.6)
EOSINOPHILS RELATIVE PERCENT: 0 %
EPITHELIAL CELLS, UA: 4 /HPF (ref 0–5)
FETAL SCREEN: NORMAL
GFR AFRICAN AMERICAN: >60
GFR NON-AFRICAN AMERICAN: >60
GLUCOSE BLD-MCNC: 136 MG/DL (ref 70–99)
GLUCOSE URINE: NEGATIVE MG/DL
HCT VFR BLD CALC: 37.5 % (ref 36–48)
HCT VFR BLD CALC: 39.4 % (ref 36–48)
HEMOGLOBIN: 12.6 G/DL (ref 12–16)
HEMOGLOBIN: 13.2 G/DL (ref 12–16)
HEPATITIS B SURFACE ANTIGEN INTERPRETATION: NORMAL
HEPATITIS C ANTIBODY INTERPRETATION: REACTIVE
HYALINE CASTS: 2 /LPF (ref 0–8)
KETONES, URINE: NEGATIVE MG/DL
LEUKOCYTE ESTERASE, URINE: ABNORMAL
LYMPHOCYTES ABSOLUTE: 0.7 K/UL (ref 1–5.1)
LYMPHOCYTES RELATIVE PERCENT: 4.3 %
Lab: ABNORMAL
MCH RBC QN AUTO: 30.1 PG (ref 26–34)
MCH RBC QN AUTO: 30.2 PG (ref 26–34)
MCHC RBC AUTO-ENTMCNC: 33.5 G/DL (ref 31–36)
MCHC RBC AUTO-ENTMCNC: 33.7 G/DL (ref 31–36)
MCV RBC AUTO: 89.5 FL (ref 80–100)
MCV RBC AUTO: 89.8 FL (ref 80–100)
METHADONE SCREEN, URINE: ABNORMAL
MICROSCOPIC EXAMINATION: YES
MONOCYTES ABSOLUTE: 0.3 K/UL (ref 0–1.3)
MONOCYTES RELATIVE PERCENT: 2.1 %
NEUTROPHILS ABSOLUTE: 14.1 K/UL (ref 1.7–7.7)
NEUTROPHILS RELATIVE PERCENT: 93.4 %
NITRITE, URINE: NEGATIVE
OPIATE SCREEN URINE: POSITIVE
OXYCODONE URINE: ABNORMAL
PDW BLD-RTO: 14.6 % (ref 12.4–15.4)
PDW BLD-RTO: 14.9 % (ref 12.4–15.4)
PH UA: 6
PH UA: 6 (ref 5–8)
PHENCYCLIDINE SCREEN URINE: ABNORMAL
PLATELET # BLD: 344 K/UL (ref 135–450)
PLATELET # BLD: 364 K/UL (ref 135–450)
PMV BLD AUTO: 8.8 FL (ref 5–10.5)
PMV BLD AUTO: 9.4 FL (ref 5–10.5)
POTASSIUM REFLEX MAGNESIUM: 3.9 MMOL/L (ref 3.5–5.1)
PROPOXYPHENE SCREEN: ABNORMAL
PROTEIN UA: ABNORMAL MG/DL
RAPID HIV 1&2: NORMAL
RBC # BLD: 4.19 M/UL (ref 4–5.2)
RBC # BLD: 4.39 M/UL (ref 4–5.2)
RBC UA: 38 /HPF (ref 0–4)
RHIG LOT NUMBER: NORMAL
RUBELLA ANTIBODY IGG: 37.1 IU/ML
SARS-COV-2, NAAT: NOT DETECTED
SODIUM BLD-SCNC: 134 MMOL/L (ref 136–145)
SPECIFIC GRAVITY UA: 1.02 (ref 1–1.03)
URINE TYPE: ABNORMAL
UROBILINOGEN, URINE: 1 E.U./DL
WBC # BLD: 15.1 K/UL (ref 4–11)
WBC # BLD: 17.2 K/UL (ref 4–11)
WBC UA: 10 /HPF (ref 0–5)

## 2020-12-07 PROCEDURE — 2500000003 HC RX 250 WO HCPCS: Performed by: NURSE ANESTHETIST, CERTIFIED REGISTERED

## 2020-12-07 PROCEDURE — 96375 TX/PRO/DX INJ NEW DRUG ADDON: CPT

## 2020-12-07 PROCEDURE — U0002 COVID-19 LAB TEST NON-CDC: HCPCS

## 2020-12-07 PROCEDURE — 96374 THER/PROPH/DIAG INJ IV PUSH: CPT

## 2020-12-07 PROCEDURE — 2580000003 HC RX 258: Performed by: NURSE ANESTHETIST, CERTIFIED REGISTERED

## 2020-12-07 PROCEDURE — 2500000003 HC RX 250 WO HCPCS: Performed by: OBSTETRICS & GYNECOLOGY

## 2020-12-07 PROCEDURE — 7100000000 HC PACU RECOVERY - FIRST 15 MIN: Performed by: OBSTETRICS & GYNECOLOGY

## 2020-12-07 PROCEDURE — 96372 THER/PROPH/DIAG INJ SC/IM: CPT

## 2020-12-07 PROCEDURE — 6360000002 HC RX W HCPCS: Performed by: OBSTETRICS & GYNECOLOGY

## 2020-12-07 PROCEDURE — 6360000002 HC RX W HCPCS: Performed by: NURSE ANESTHETIST, CERTIFIED REGISTERED

## 2020-12-07 PROCEDURE — 88307 TISSUE EXAM BY PATHOLOGIST: CPT

## 2020-12-07 PROCEDURE — 85027 COMPLETE CBC AUTOMATED: CPT

## 2020-12-07 PROCEDURE — 80048 BASIC METABOLIC PNL TOTAL CA: CPT

## 2020-12-07 PROCEDURE — 3700000001 HC ADD 15 MINUTES (ANESTHESIA): Performed by: OBSTETRICS & GYNECOLOGY

## 2020-12-07 PROCEDURE — 86803 HEPATITIS C AB TEST: CPT

## 2020-12-07 PROCEDURE — 86900 BLOOD TYPING SEROLOGIC ABO: CPT

## 2020-12-07 PROCEDURE — 80307 DRUG TEST PRSMV CHEM ANLYZR: CPT

## 2020-12-07 PROCEDURE — 1220000000 HC SEMI PRIVATE OB R&B

## 2020-12-07 PROCEDURE — 3700000000 HC ANESTHESIA ATTENDED CARE: Performed by: OBSTETRICS & GYNECOLOGY

## 2020-12-07 PROCEDURE — 86762 RUBELLA ANTIBODY: CPT

## 2020-12-07 PROCEDURE — 85461 HEMOGLOBIN FETAL: CPT

## 2020-12-07 PROCEDURE — 81001 URINALYSIS AUTO W/SCOPE: CPT

## 2020-12-07 PROCEDURE — 86780 TREPONEMA PALLIDUM: CPT

## 2020-12-07 PROCEDURE — 2580000003 HC RX 258: Performed by: OBSTETRICS & GYNECOLOGY

## 2020-12-07 PROCEDURE — 2709999900 HC NON-CHARGEABLE SUPPLY: Performed by: OBSTETRICS & GYNECOLOGY

## 2020-12-07 PROCEDURE — 86701 HIV-1ANTIBODY: CPT

## 2020-12-07 PROCEDURE — 3609079900 HC CESAREAN SECTION: Performed by: OBSTETRICS & GYNECOLOGY

## 2020-12-07 PROCEDURE — 86901 BLOOD TYPING SEROLOGIC RH(D): CPT

## 2020-12-07 PROCEDURE — 36415 COLL VENOUS BLD VENIPUNCTURE: CPT

## 2020-12-07 PROCEDURE — 85025 COMPLETE CBC W/AUTO DIFF WBC: CPT

## 2020-12-07 PROCEDURE — 86850 RBC ANTIBODY SCREEN: CPT

## 2020-12-07 PROCEDURE — 87340 HEPATITIS B SURFACE AG IA: CPT

## 2020-12-07 PROCEDURE — 7100000001 HC PACU RECOVERY - ADDTL 15 MIN: Performed by: OBSTETRICS & GYNECOLOGY

## 2020-12-07 RX ORDER — NALOXONE HYDROCHLORIDE 0.4 MG/ML
0.4 INJECTION, SOLUTION INTRAMUSCULAR; INTRAVENOUS; SUBCUTANEOUS PRN
Status: DISCONTINUED | OUTPATIENT
Start: 2020-12-07 | End: 2020-12-08

## 2020-12-07 RX ORDER — DOCUSATE SODIUM 100 MG/1
100 CAPSULE, LIQUID FILLED ORAL 2 TIMES DAILY
Status: CANCELLED | OUTPATIENT
Start: 2020-12-07

## 2020-12-07 RX ORDER — ROCURONIUM BROMIDE 10 MG/ML
INJECTION, SOLUTION INTRAVENOUS PRN
Status: DISCONTINUED | OUTPATIENT
Start: 2020-12-07 | End: 2020-12-07 | Stop reason: SDUPTHER

## 2020-12-07 RX ORDER — OXYCODONE HYDROCHLORIDE AND ACETAMINOPHEN 5; 325 MG/1; MG/1
2 TABLET ORAL EVERY 4 HOURS PRN
Status: DISCONTINUED | OUTPATIENT
Start: 2020-12-07 | End: 2020-12-08

## 2020-12-07 RX ORDER — MORPHINE SULFATE 4 MG/ML
4 INJECTION, SOLUTION INTRAMUSCULAR; INTRAVENOUS EVERY 4 HOURS PRN
Status: DISCONTINUED | OUTPATIENT
Start: 2020-12-07 | End: 2020-12-09

## 2020-12-07 RX ORDER — PROPOFOL 10 MG/ML
INJECTION, EMULSION INTRAVENOUS PRN
Status: DISCONTINUED | OUTPATIENT
Start: 2020-12-07 | End: 2020-12-07 | Stop reason: SDUPTHER

## 2020-12-07 RX ORDER — METHYLERGONOVINE MALEATE 0.2 MG/ML
200 INJECTION INTRAVENOUS PRN
Status: CANCELLED | OUTPATIENT
Start: 2020-12-07

## 2020-12-07 RX ORDER — MORPHINE SULFATE/0.9% NACL/PF 1 MG/ML
SYRINGE (ML) INJECTION CONTINUOUS
Status: CANCELLED | OUTPATIENT
Start: 2020-12-07

## 2020-12-07 RX ORDER — ACETAMINOPHEN 325 MG/1
650 TABLET ORAL EVERY 4 HOURS PRN
Status: DISCONTINUED | OUTPATIENT
Start: 2020-12-07 | End: 2020-12-08

## 2020-12-07 RX ORDER — LANOLIN 100 %
OINTMENT (GRAM) TOPICAL
Status: CANCELLED | OUTPATIENT
Start: 2020-12-07

## 2020-12-07 RX ORDER — SODIUM CHLORIDE 0.9 % (FLUSH) 0.9 %
10 SYRINGE (ML) INJECTION PRN
Status: CANCELLED | OUTPATIENT
Start: 2020-12-07

## 2020-12-07 RX ORDER — SODIUM CHLORIDE 0.9 % (FLUSH) 0.9 %
10 SYRINGE (ML) INJECTION PRN
Status: DISCONTINUED | OUTPATIENT
Start: 2020-12-07 | End: 2020-12-10 | Stop reason: HOSPADM

## 2020-12-07 RX ORDER — ONDANSETRON 2 MG/ML
4 INJECTION INTRAMUSCULAR; INTRAVENOUS EVERY 6 HOURS PRN
Status: DISCONTINUED | OUTPATIENT
Start: 2020-12-07 | End: 2020-12-10 | Stop reason: HOSPADM

## 2020-12-07 RX ORDER — NALOXONE HYDROCHLORIDE 0.4 MG/ML
0.4 INJECTION, SOLUTION INTRAMUSCULAR; INTRAVENOUS; SUBCUTANEOUS PRN
Status: CANCELLED | OUTPATIENT
Start: 2020-12-07

## 2020-12-07 RX ORDER — IBUPROFEN 400 MG/1
800 TABLET ORAL EVERY 8 HOURS PRN
Status: CANCELLED | OUTPATIENT
Start: 2020-12-07

## 2020-12-07 RX ORDER — SODIUM CHLORIDE, SODIUM LACTATE, POTASSIUM CHLORIDE, CALCIUM CHLORIDE 600; 310; 30; 20 MG/100ML; MG/100ML; MG/100ML; MG/100ML
INJECTION, SOLUTION INTRAVENOUS CONTINUOUS PRN
Status: DISCONTINUED | OUTPATIENT
Start: 2020-12-07 | End: 2020-12-07 | Stop reason: SDUPTHER

## 2020-12-07 RX ORDER — SODIUM CHLORIDE, SODIUM LACTATE, POTASSIUM CHLORIDE, CALCIUM CHLORIDE 600; 310; 30; 20 MG/100ML; MG/100ML; MG/100ML; MG/100ML
INJECTION, SOLUTION INTRAVENOUS CONTINUOUS
Status: DISCONTINUED | OUTPATIENT
Start: 2020-12-07 | End: 2020-12-10 | Stop reason: HOSPADM

## 2020-12-07 RX ORDER — ONDANSETRON 2 MG/ML
INJECTION INTRAMUSCULAR; INTRAVENOUS PRN
Status: DISCONTINUED | OUTPATIENT
Start: 2020-12-07 | End: 2020-12-07 | Stop reason: SDUPTHER

## 2020-12-07 RX ORDER — CLINDAMYCIN PHOSPHATE 900 MG/50ML
900 INJECTION INTRAVENOUS EVERY 8 HOURS
Status: COMPLETED | OUTPATIENT
Start: 2020-12-07 | End: 2020-12-08

## 2020-12-07 RX ORDER — FENTANYL CITRATE 50 UG/ML
INJECTION, SOLUTION INTRAMUSCULAR; INTRAVENOUS PRN
Status: DISCONTINUED | OUTPATIENT
Start: 2020-12-07 | End: 2020-12-07 | Stop reason: SDUPTHER

## 2020-12-07 RX ORDER — PRENATAL VIT/IRON FUM/FOLIC AC 27MG-0.8MG
1 TABLET ORAL DAILY
Status: CANCELLED | OUTPATIENT
Start: 2020-12-07

## 2020-12-07 RX ORDER — LANOLIN 100 %
OINTMENT (GRAM) TOPICAL
Status: DISCONTINUED | OUTPATIENT
Start: 2020-12-07 | End: 2020-12-10 | Stop reason: HOSPADM

## 2020-12-07 RX ORDER — LIDOCAINE HYDROCHLORIDE 20 MG/ML
INJECTION, SOLUTION INFILTRATION; PERINEURAL PRN
Status: DISCONTINUED | OUTPATIENT
Start: 2020-12-07 | End: 2020-12-07 | Stop reason: SDUPTHER

## 2020-12-07 RX ORDER — SODIUM CHLORIDE 0.9 % (FLUSH) 0.9 %
10 SYRINGE (ML) INJECTION EVERY 12 HOURS SCHEDULED
Status: CANCELLED | OUTPATIENT
Start: 2020-12-07

## 2020-12-07 RX ORDER — SIMETHICONE 80 MG
80 TABLET,CHEWABLE ORAL EVERY 6 HOURS PRN
Status: CANCELLED | OUTPATIENT
Start: 2020-12-07

## 2020-12-07 RX ORDER — SODIUM CHLORIDE, SODIUM LACTATE, POTASSIUM CHLORIDE, CALCIUM CHLORIDE 600; 310; 30; 20 MG/100ML; MG/100ML; MG/100ML; MG/100ML
INJECTION, SOLUTION INTRAVENOUS CONTINUOUS
Status: DISCONTINUED | OUTPATIENT
Start: 2020-12-07 | End: 2020-12-07 | Stop reason: SDUPTHER

## 2020-12-07 RX ORDER — TRISODIUM CITRATE DIHYDRATE AND CITRIC ACID MONOHYDRATE 500; 334 MG/5ML; MG/5ML
30 SOLUTION ORAL ONCE
Status: DISCONTINUED | OUTPATIENT
Start: 2020-12-07 | End: 2020-12-10 | Stop reason: HOSPADM

## 2020-12-07 RX ORDER — SODIUM CHLORIDE 0.9 % (FLUSH) 0.9 %
10 SYRINGE (ML) INJECTION PRN
Status: DISCONTINUED | OUTPATIENT
Start: 2020-12-07 | End: 2020-12-07 | Stop reason: SDUPTHER

## 2020-12-07 RX ORDER — PRENATAL VIT/IRON FUM/FOLIC AC 27MG-0.8MG
1 TABLET ORAL DAILY
Status: DISCONTINUED | OUTPATIENT
Start: 2020-12-07 | End: 2020-12-10 | Stop reason: HOSPADM

## 2020-12-07 RX ORDER — METOCLOPRAMIDE HYDROCHLORIDE 5 MG/ML
INJECTION INTRAMUSCULAR; INTRAVENOUS
Status: DISPENSED
Start: 2020-12-07 | End: 2020-12-07

## 2020-12-07 RX ORDER — MORPHINE SULFATE/0.9% NACL/PF 1 MG/ML
SYRINGE (ML) INJECTION CONTINUOUS
Status: DISCONTINUED | OUTPATIENT
Start: 2020-12-07 | End: 2020-12-08

## 2020-12-07 RX ORDER — METOCLOPRAMIDE HYDROCHLORIDE 5 MG/ML
10 INJECTION INTRAMUSCULAR; INTRAVENOUS ONCE
Status: DISCONTINUED | OUTPATIENT
Start: 2020-12-07 | End: 2020-12-10 | Stop reason: HOSPADM

## 2020-12-07 RX ORDER — TRISODIUM CITRATE DIHYDRATE AND CITRIC ACID MONOHYDRATE 500; 334 MG/5ML; MG/5ML
SOLUTION ORAL
Status: DISPENSED
Start: 2020-12-07 | End: 2020-12-07

## 2020-12-07 RX ORDER — DIPHENHYDRAMINE HYDROCHLORIDE 50 MG/ML
25 INJECTION INTRAMUSCULAR; INTRAVENOUS EVERY 6 HOURS PRN
Status: DISCONTINUED | OUTPATIENT
Start: 2020-12-07 | End: 2020-12-10 | Stop reason: HOSPADM

## 2020-12-07 RX ORDER — KETOROLAC TROMETHAMINE 30 MG/ML
30 INJECTION, SOLUTION INTRAMUSCULAR; INTRAVENOUS EVERY 6 HOURS
Status: COMPLETED | OUTPATIENT
Start: 2020-12-07 | End: 2020-12-07

## 2020-12-07 RX ORDER — NICOTINE 21 MG/24HR
1 PATCH, TRANSDERMAL 24 HOURS TRANSDERMAL DAILY
Status: CANCELLED | OUTPATIENT
Start: 2020-12-07

## 2020-12-07 RX ORDER — SUCCINYLCHOLINE/SOD CL,ISO/PF 200MG/10ML
SYRINGE (ML) INTRAVENOUS PRN
Status: DISCONTINUED | OUTPATIENT
Start: 2020-12-07 | End: 2020-12-07 | Stop reason: SDUPTHER

## 2020-12-07 RX ORDER — SODIUM CHLORIDE 0.9 % (FLUSH) 0.9 %
10 SYRINGE (ML) INJECTION EVERY 12 HOURS SCHEDULED
Status: DISCONTINUED | OUTPATIENT
Start: 2020-12-07 | End: 2020-12-10 | Stop reason: HOSPADM

## 2020-12-07 RX ORDER — SODIUM CHLORIDE 0.9 % (FLUSH) 0.9 %
10 SYRINGE (ML) INJECTION EVERY 12 HOURS SCHEDULED
Status: DISCONTINUED | OUTPATIENT
Start: 2020-12-07 | End: 2020-12-07 | Stop reason: SDUPTHER

## 2020-12-07 RX ORDER — OXYTOCIN 10 [USP'U]/ML
INJECTION, SOLUTION INTRAMUSCULAR; INTRAVENOUS PRN
Status: DISCONTINUED | OUTPATIENT
Start: 2020-12-07 | End: 2020-12-07 | Stop reason: SDUPTHER

## 2020-12-07 RX ORDER — SODIUM CHLORIDE, SODIUM LACTATE, POTASSIUM CHLORIDE, CALCIUM CHLORIDE 600; 310; 30; 20 MG/100ML; MG/100ML; MG/100ML; MG/100ML
1000 INJECTION, SOLUTION INTRAVENOUS ONCE
Status: DISCONTINUED | OUTPATIENT
Start: 2020-12-07 | End: 2020-12-10 | Stop reason: HOSPADM

## 2020-12-07 RX ORDER — DOCUSATE SODIUM 100 MG/1
100 CAPSULE, LIQUID FILLED ORAL 2 TIMES DAILY
Status: DISCONTINUED | OUTPATIENT
Start: 2020-12-07 | End: 2020-12-10 | Stop reason: HOSPADM

## 2020-12-07 RX ADMIN — CEFAZOLIN SODIUM 2 G: 10 INJECTION, POWDER, FOR SOLUTION INTRAVENOUS at 02:45

## 2020-12-07 RX ADMIN — SODIUM CHLORIDE, SODIUM LACTATE, POTASSIUM CHLORIDE, AND CALCIUM CHLORIDE: .6; .31; .03; .02 INJECTION, SOLUTION INTRAVENOUS at 03:14

## 2020-12-07 RX ADMIN — OXYTOCIN 20 UNITS: 10 INJECTION INTRAVENOUS at 03:01

## 2020-12-07 RX ADMIN — MORPHINE SULFATE: 1 INJECTION INTRAVENOUS at 05:45

## 2020-12-07 RX ADMIN — MORPHINE SULFATE 30 MG: 1 INJECTION INTRAVENOUS at 19:12

## 2020-12-07 RX ADMIN — OXYTOCIN 20 UNITS: 10 INJECTION INTRAVENOUS at 03:16

## 2020-12-07 RX ADMIN — SODIUM CHLORIDE, SODIUM LACTATE, POTASSIUM CHLORIDE, AND CALCIUM CHLORIDE: .6; .31; .03; .02 INJECTION, SOLUTION INTRAVENOUS at 02:44

## 2020-12-07 RX ADMIN — CLINDAMYCIN PHOSPHATE 900 MG: 900 INJECTION, SOLUTION INTRAVENOUS at 16:22

## 2020-12-07 RX ADMIN — FENTANYL CITRATE 500 MCG: 50 INJECTION, SOLUTION INTRAMUSCULAR; INTRAVENOUS at 03:02

## 2020-12-07 RX ADMIN — ROCURONIUM BROMIDE 25 MG: 10 INJECTION INTRAVENOUS at 03:01

## 2020-12-07 RX ADMIN — HUMAN RHO(D) IMMUNE GLOBULIN 300 MCG: 300 INJECTION, SOLUTION INTRAMUSCULAR at 14:08

## 2020-12-07 RX ADMIN — KETOROLAC TROMETHAMINE 30 MG: 30 INJECTION, SOLUTION INTRAMUSCULAR at 22:42

## 2020-12-07 RX ADMIN — PROPOFOL 150 MG: 10 INJECTION, EMULSION INTRAVENOUS at 02:57

## 2020-12-07 RX ADMIN — KETOROLAC TROMETHAMINE 30 MG: 30 INJECTION, SOLUTION INTRAMUSCULAR at 16:21

## 2020-12-07 RX ADMIN — MORPHINE SULFATE 30 MG: 1 INJECTION INTRAVENOUS at 12:08

## 2020-12-07 RX ADMIN — KETOROLAC TROMETHAMINE 30 MG: 30 INJECTION, SOLUTION INTRAMUSCULAR at 05:50

## 2020-12-07 RX ADMIN — LIDOCAINE HYDROCHLORIDE 100 MG: 20 INJECTION, SOLUTION INFILTRATION; PERINEURAL at 02:57

## 2020-12-07 RX ADMIN — CLINDAMYCIN PHOSPHATE 900 MG: 900 INJECTION, SOLUTION INTRAVENOUS at 09:14

## 2020-12-07 RX ADMIN — KETOROLAC TROMETHAMINE 30 MG: 30 INJECTION, SOLUTION INTRAMUSCULAR at 10:53

## 2020-12-07 RX ADMIN — ROCURONIUM BROMIDE 5 MG: 10 INJECTION INTRAVENOUS at 02:57

## 2020-12-07 RX ADMIN — SUGAMMADEX 200 MG: 100 INJECTION, SOLUTION INTRAVENOUS at 03:31

## 2020-12-07 RX ADMIN — Medication 100 MG: at 02:57

## 2020-12-07 RX ADMIN — ONDANSETRON 4 MG: 2 INJECTION INTRAMUSCULAR; INTRAVENOUS at 03:28

## 2020-12-07 RX ADMIN — GENTAMICIN SULFATE 408 MG: 40 INJECTION, SOLUTION INTRAMUSCULAR; INTRAVENOUS at 10:13

## 2020-12-07 ASSESSMENT — PULMONARY FUNCTION TESTS
PIF_VALUE: 0
PIF_VALUE: 17
PIF_VALUE: 14
PIF_VALUE: 1
PIF_VALUE: 14
PIF_VALUE: 3
PIF_VALUE: 1
PIF_VALUE: 1
PIF_VALUE: 14
PIF_VALUE: 1
PIF_VALUE: 14
PIF_VALUE: 3
PIF_VALUE: 13
PIF_VALUE: 13
PIF_VALUE: 14
PIF_VALUE: 14
PIF_VALUE: 13
PIF_VALUE: 13
PIF_VALUE: 2
PIF_VALUE: 2
PIF_VALUE: 3
PIF_VALUE: 1
PIF_VALUE: 15
PIF_VALUE: 1
PIF_VALUE: 3
PIF_VALUE: 1
PIF_VALUE: 4
PIF_VALUE: 1
PIF_VALUE: 15
PIF_VALUE: 14
PIF_VALUE: 13
PIF_VALUE: 16
PIF_VALUE: 1
PIF_VALUE: 14
PIF_VALUE: 1
PIF_VALUE: 18
PIF_VALUE: 14
PIF_VALUE: 15
PIF_VALUE: 2
PIF_VALUE: 2
PIF_VALUE: 4
PIF_VALUE: 16
PIF_VALUE: 3
PIF_VALUE: 7
PIF_VALUE: 3
PIF_VALUE: 1
PIF_VALUE: 3
PIF_VALUE: 15
PIF_VALUE: 2
PIF_VALUE: 14
PIF_VALUE: 14
PIF_VALUE: 0
PIF_VALUE: 4
PIF_VALUE: 14
PIF_VALUE: 13
PIF_VALUE: 3
PIF_VALUE: 8
PIF_VALUE: 3
PIF_VALUE: 3
PIF_VALUE: 14

## 2020-12-07 ASSESSMENT — PAIN SCALES - GENERAL
PAINLEVEL_OUTOF10: 8
PAINLEVEL_OUTOF10: 7
PAINLEVEL_OUTOF10: 7
PAINLEVEL_OUTOF10: 8
PAINLEVEL_OUTOF10: 6
PAINLEVEL_OUTOF10: 6
PAINLEVEL_OUTOF10: 10
PAINLEVEL_OUTOF10: 7

## 2020-12-07 ASSESSMENT — PAIN DESCRIPTION - DESCRIPTORS: DESCRIPTORS: CRAMPING

## 2020-12-07 NOTE — CARE COORDINATION
SW consult: No Prenatal care and heroin use during pregnancy.  called Patient in her Room( [address]). Discussed that she had no pre- Care. Jesi Murguia reports that she would like to go into substance abuse treatment. She would like to go into treatment with baby if possible. She reports that she was in treatment in the past [facility] by [facility] in [place], [place]., she stated that she had called them at one point and they had a waiting list.  Patient reports that she tried to get into treatment(suboxne) during her pregnancy but no one would help her. Patient reports that the father of her baby is Sunshine Barrientos( 29 yrs old)- she reports that she lives on [address] with Nitin Tejada and his mother, (Javi's mother is currently covid + and in a nursing home)    Patient reports that her Cristo Magana has guardianship of her 2 older children, 9 yr old girl and 2 yr old boy. ( these children were born in [place]), she reports that  Her father lives in [address] [place] [address]. Patient asked if she could get help with getting a car seat.  advised patient that I need a current address on [address] where she lives with FOB. Her cell phone #.  advised patient I would be her  while she is in the hospital and working with to get her into treatment. Please call with any questions.     Froylan HENRY,MSW  Cell- [phone]

## 2020-12-07 NOTE — OP NOTE
Department of Obstetrics and Gynecology   Section Note    Indications:     Pre-operative Diagnosis: 35-36 wks gestation, active labor, fetal decelerations, IV Drug use, no prenatal care, Hep B +, Previous  x 2    Post-operative Diagnosis: same    Surgeon: Dc Samuel MD    Anesthesia: Spinal anesthesia    Procedure:   Repeat low transverse  section    Procedure Details   The patient was seen and the risks, benefits, complications, treatment options, and expected outcomes were discussed with the patient. The patient concurred with the proposed plan, giving informed consent. The patient was taken to the OR emergently where general anesthesia was placed without difficulty. The patient was prepped and draped in the normal sterile fashion prior to induction of anesthesia. A time-out was performed where patient identity as well as procedure to be performed were confirmed with the entire OR staff. A Pfannenstiel incision was made with scalpel. The incision was carried down through the subcutaneous tissue to the fascia. Fascial incision was made and extended transversely. The fascia was  from the underlying rectus tissue superiorly and inferiorly. The peritoneum was identified and entered. Peritoneal incision was extended longitudinally with excellent visualization of the bladder. The utero-vesical peritoneal reflection was incised transversely and the bladder flap was bluntly freed from the lower uterine segment. A low transverse uterine incision was made. Delivered from vtx presentation was a ? gram ? infant with Apgar scores of ? at one minute and ? at five minutes. After the umbilical cord was clamped and cut cord blood was obtained for evaluation. The placenta was removed intact and appeared normal. The uterine outline, tubes and ovaries appeared normal. The uterine incision was closed with running locked sutures of 0 Monocryl. Hemostasis was observed.  The gutters were irrigated and cleared of all clots and debris. The fascia was then reapproximated with running sutures of 0 Vicryl, the underlying layer of adipose reapproximated with 3-0 Vicryl and the skin closed with 4-0 Vicryl. Instrument, sponge, and needle counts were correct prior the abdominal closure and at the conclusion of the case. Findings:  LVF infant    Estimated Blood Loss:  800ml           Drains: vee           Total IV Fluids: see OR record    UOP: clear urine at end of procedure      Specimens: placenta to path. ? chorio           Implants:            Complications:  none           Disposition: PACU - hemodynamically stable. Condition: infant stable to general nursery    Attending Attestation: I was present and scrubbed for the entire procedure.

## 2020-12-07 NOTE — FLOWSHEET NOTE
RN encouraged pt to get up to chair, pt declined. RN asked if pt would like to go to [facility], pt also declined at this time. Pt tolerated dinner of grilled cheese and soup. No needs at this time. Call light and PCA pump clicker within reach.

## 2020-12-07 NOTE — CONSULTS
Hospital Medicine  Consult History & Physical        Chief Complaint:  IV drug withdrawal management    Date of Service: Pt seen/examined in consultation on 2020    History Of Present Illness:      Patient is 22-year-old female with history of IV drug abuse, heroin addiction last administration yesterday, hepatitis C was admitted yesterday for emergent . Medicine was consulted for management of drug withdrawal symptoms. Patient was seen and examined she is on a morphine pump. Still complaining of pain at her  site. Denies nausea, vomiting, fever, chills, chest pain, shortness of breath. Had 2 C-sections previously. Denies any history of infective endocarditis. She is interested to go for drug rehab. Past Medical History:        Diagnosis Date    Drug abuse [facility])        Past Surgical History:        Procedure Laterality Date    ABDOMEN SURGERY       SECTION         Medications Prior to Admission:    Prior to Admission medications    Not on File       Allergies:  Patient has no known allergies. Social History:      The patient currently lives home, IV drug abuse with heroine    TOBACCO:   reports that she quit smoking about 7 months ago. Her smoking use included cigarettes. She started smoking about 6 years ago. She has a 3.00 pack-year smoking history. She does not have any smokeless tobacco history on file. ETOH:   reports previous alcohol use. Family History:      Reviewed in detail Positive as follows:        Problem Relation Age of Onset    Cancer Mother     Stroke Mother        REVIEW OF SYSTEMS:   Pertinent positives as noted in the HPI. All other systems reviewed and negative.     PHYSICAL EXAM PERFORMED:  /66   Pulse 71   Temp 98.4 °F (36.9 °C) (Axillary)   Resp 14   Ht 5' 4\" (1.626 m)   Wt 180 lb (81.6 kg)   SpO2 97%   Breastfeeding Unknown   BMI 30.90 kg/m²   General appearance: No apparent distress, appears stated age and cooperative. HEENT: Normal cephalic, atraumatic without obvious deformity. Pupils equal, round, and reactive to light. Extra ocular muscles intact. Conjunctivae/corneas clear. Neck: Supple, with full range of motion. No jugular venous distention. Trachea midline. Respiratory:  Normal respiratory effort. Clear to auscultation, bilaterally without Rales/Wheezes/Rhonchi. Cardiovascular: sinus tachycardia with normal S1/S2 without murmurs, rubs or gallops. Abdomen: Soft, c section site tender to touch, dressing with no obvious discharge. with normal bowel sounds. Musculoskeletal:  No clubbing, cyanosis or edema bilaterally. Skin: Skin color, texture, turgor normal.  No rashes or lesions. Neurologic:  Neurovascularly intact without any focal sensory/motor deficits. Cranial nerves: II-XII intact, grossly non-focal.  Psychiatric: Alert and oriented, thought content appropriate, normal insight  Capillary Refill: Brisk,< 3 seconds   Peripheral Pulses: +2 palpable, equal bilaterally     Labs:     Recent Labs     12/07/20  0257 12/07/20  0334   WBC 15.1* 17.2*   HGB 13.2 12.6   HCT 39.4 37.5    344     No results for input(s): NA, K, CL, CO2, BUN, CREATININE, CALCIUM, PHOS in the last 72 hours. Invalid input(s): MAGNES  No results for input(s): AST, ALT, BILIDIR, BILITOT, ALKPHOS in the last 72 hours. No results for input(s): INR in the last 72 hours. No results for input(s): Nohemi Deepali in the last 72 hours.     Urinalysis:    Lab Results   Component Value Date    NITRU Negative 12/07/2020    WBCUA 10 12/07/2020    BACTERIA 1+ 06/25/2020    RBCUA 38 12/07/2020    BLOODU MODERATE 12/07/2020    SPECGRAV 1.020 12/07/2020    GLUCOSEU Negative 12/07/2020       Radiology: I have reviewed the radiology reports with the following interpretation:     No orders to display          EKG:  I have reviewed the EKG with the following interpretation:    @ekgread@      ASSESSMENT:    Active Hospital Problems Diagnosis Date Noted    Normal labor [O80, Z37.9] 2020       Assessment  #IV drug abuse with heroine  #S/p   viable baby  #Hepatitis C f/u as outpatient    Plan  We will Toradol morphine pump until 4 PM  Will start on IV morphine as needed 4 mg every 4 hour. And oxycodone. Continue Toradol for 24 hours  Continue IV fluid with Ringer lactate at 125 cc/h. Monitor BMP  If blood pressure tolerates would consider clonidine to avoid withdrawal symptoms. further management per ob/gyn    DVT Prophylaxis: SCD  Diet: DIET GENERAL;  Code Status: Full Code    PT/OT Eval Status: Active and ongoing    Dispo - inpatient. Will follow. Thank you for the consultation.     This chart was likely completed using voice recognition technology and may contain unintended grammatical , phraseology,and/or punctuation errors      Eric Wing MD

## 2020-12-07 NOTE — FLOWSHEET NOTE
1.5ml Morphine PCA 1mg/ml wasted with Sarah Valladares RN, new PCA syringe placed and verified with Sarah Valladares RN

## 2020-12-07 NOTE — ANESTHESIA POSTPROCEDURE EVALUATION
Department of Anesthesiology  Postprocedure Note    Patient: Stewart Ibarra  MRN: [id number]  YOB: 1995  Date of evaluation: 12/7/2020  Time:  4:02 AM     Procedure Summary     Date:  12/07/20 Room / Location:      Anesthesia Start:  0244 Anesthesia Stop:  0402    Procedure:  Labor Analgesia Diagnosis:      Scheduled Providers:   Responsible Provider:  Rosita Sims MD    Anesthesia Type:  Not recorded ASA Status:  Not recorded          Anesthesia Type: No value filed. Tato Phase I:      Tato Phase II:      Last vitals: Reviewed and per EMR flowsheets.        Anesthesia Post Evaluation    Patient location during evaluation: PACU  Patient participation: complete - patient participated  Level of consciousness: awake and alert  Pain score: 3  Airway patency: patent  Nausea & Vomiting: no vomiting and no nausea  Complications: no  Cardiovascular status: hemodynamically stable  Respiratory status: spontaneous ventilation and room air  Hydration status: stable  Comments:

## 2020-12-08 LAB
HCT VFR BLD CALC: 25.3 % (ref 36–48)
HEMOGLOBIN: 8.7 G/DL (ref 12–16)
MCH RBC QN AUTO: 30.5 PG (ref 26–34)
MCHC RBC AUTO-ENTMCNC: 34.2 G/DL (ref 31–36)
MCV RBC AUTO: 89.4 FL (ref 80–100)
PDW BLD-RTO: 14.9 % (ref 12.4–15.4)
PLATELET # BLD: 249 K/UL (ref 135–450)
PMV BLD AUTO: 8.5 FL (ref 5–10.5)
RBC # BLD: 2.84 M/UL (ref 4–5.2)
TOTAL SYPHILLIS IGG/IGM: NORMAL
WBC # BLD: 11.5 K/UL (ref 4–11)

## 2020-12-08 PROCEDURE — 6360000002 HC RX W HCPCS: Performed by: INTERNAL MEDICINE

## 2020-12-08 PROCEDURE — 6370000000 HC RX 637 (ALT 250 FOR IP): Performed by: INTERNAL MEDICINE

## 2020-12-08 PROCEDURE — 6370000000 HC RX 637 (ALT 250 FOR IP): Performed by: OBSTETRICS & GYNECOLOGY

## 2020-12-08 PROCEDURE — 6360000002 HC RX W HCPCS: Performed by: OBSTETRICS & GYNECOLOGY

## 2020-12-08 PROCEDURE — 36415 COLL VENOUS BLD VENIPUNCTURE: CPT

## 2020-12-08 PROCEDURE — 1220000000 HC SEMI PRIVATE OB R&B

## 2020-12-08 PROCEDURE — 2500000003 HC RX 250 WO HCPCS: Performed by: OBSTETRICS & GYNECOLOGY

## 2020-12-08 PROCEDURE — 85027 COMPLETE CBC AUTOMATED: CPT

## 2020-12-08 PROCEDURE — 2580000003 HC RX 258: Performed by: OBSTETRICS & GYNECOLOGY

## 2020-12-08 RX ORDER — CALCIUM CARBONATE 200(500)MG
500 TABLET,CHEWABLE ORAL 3 TIMES DAILY PRN
Status: DISCONTINUED | OUTPATIENT
Start: 2020-12-08 | End: 2020-12-10 | Stop reason: HOSPADM

## 2020-12-08 RX ORDER — OXYCODONE HCL 10 MG/1
10 TABLET, FILM COATED, EXTENDED RELEASE ORAL EVERY 12 HOURS SCHEDULED
Status: DISCONTINUED | OUTPATIENT
Start: 2020-12-08 | End: 2020-12-10 | Stop reason: HOSPADM

## 2020-12-08 RX ORDER — OXYCODONE HYDROCHLORIDE 5 MG/1
5 TABLET ORAL EVERY 6 HOURS PRN
Status: DISCONTINUED | OUTPATIENT
Start: 2020-12-08 | End: 2020-12-10 | Stop reason: HOSPADM

## 2020-12-08 RX ORDER — FERROUS SULFATE TAB EC 324 MG (65 MG FE EQUIVALENT) 324 (65 FE) MG
324 TABLET DELAYED RESPONSE ORAL
Status: DISCONTINUED | OUTPATIENT
Start: 2020-12-08 | End: 2020-12-10 | Stop reason: HOSPADM

## 2020-12-08 RX ORDER — IBUPROFEN 400 MG/1
800 TABLET ORAL
Status: DISCONTINUED | OUTPATIENT
Start: 2020-12-08 | End: 2020-12-10 | Stop reason: HOSPADM

## 2020-12-08 RX ORDER — ACETAMINOPHEN 325 MG/1
650 TABLET ORAL 3 TIMES DAILY
Status: DISCONTINUED | OUTPATIENT
Start: 2020-12-08 | End: 2020-12-10 | Stop reason: HOSPADM

## 2020-12-08 RX ORDER — PANTOPRAZOLE SODIUM 40 MG/1
40 TABLET, DELAYED RELEASE ORAL
Status: DISCONTINUED | OUTPATIENT
Start: 2020-12-09 | End: 2020-12-10 | Stop reason: HOSPADM

## 2020-12-08 RX ORDER — FAMOTIDINE 20 MG/1
20 TABLET, FILM COATED ORAL 2 TIMES DAILY
Status: DISCONTINUED | OUTPATIENT
Start: 2020-12-08 | End: 2020-12-10 | Stop reason: HOSPADM

## 2020-12-08 RX ORDER — NICOTINE 21 MG/24HR
1 PATCH, TRANSDERMAL 24 HOURS TRANSDERMAL DAILY
Status: DISCONTINUED | OUTPATIENT
Start: 2020-12-08 | End: 2020-12-10 | Stop reason: HOSPADM

## 2020-12-08 RX ORDER — IBUPROFEN 400 MG/1
800 TABLET ORAL EVERY 8 HOURS PRN
Status: DISCONTINUED | OUTPATIENT
Start: 2020-12-08 | End: 2020-12-08

## 2020-12-08 RX ADMIN — FAMOTIDINE 20 MG: 20 TABLET ORAL at 18:41

## 2020-12-08 RX ADMIN — OXYCODONE HYDROCHLORIDE AND ACETAMINOPHEN 2 TABLET: 5; 325 TABLET ORAL at 06:30

## 2020-12-08 RX ADMIN — MORPHINE SULFATE 30 MG: 1 INJECTION INTRAVENOUS at 03:29

## 2020-12-08 RX ADMIN — IBUPROFEN 800 MG: 400 TABLET, FILM COATED ORAL at 08:25

## 2020-12-08 RX ADMIN — IBUPROFEN 800 MG: 400 TABLET, FILM COATED ORAL at 16:01

## 2020-12-08 RX ADMIN — MORPHINE SULFATE 4 MG: 4 INJECTION, SOLUTION INTRAMUSCULAR; INTRAVENOUS at 05:19

## 2020-12-08 RX ADMIN — CLINDAMYCIN PHOSPHATE 900 MG: 900 INJECTION, SOLUTION INTRAVENOUS at 01:26

## 2020-12-08 RX ADMIN — OXYCODONE HYDROCHLORIDE 10 MG: 10 TABLET, FILM COATED, EXTENDED RELEASE ORAL at 21:28

## 2020-12-08 RX ADMIN — Medication 10 ML: at 08:27

## 2020-12-08 RX ADMIN — OXYCODONE HYDROCHLORIDE AND ACETAMINOPHEN 2 TABLET: 5; 325 TABLET ORAL at 12:33

## 2020-12-08 RX ADMIN — OXYCODONE HYDROCHLORIDE 5 MG: 5 TABLET ORAL at 23:32

## 2020-12-08 RX ADMIN — ACETAMINOPHEN 650 MG: 325 TABLET ORAL at 21:28

## 2020-12-08 RX ADMIN — DOCUSATE SODIUM 100 MG: 100 CAPSULE, LIQUID FILLED ORAL at 21:30

## 2020-12-08 RX ADMIN — ANTACID TABLETS 500 MG: 500 TABLET, CHEWABLE ORAL at 18:41

## 2020-12-08 RX ADMIN — OXYCODONE HYDROCHLORIDE 5 MG: 5 TABLET ORAL at 15:59

## 2020-12-08 RX ADMIN — DOCUSATE SODIUM 100 MG: 100 CAPSULE, LIQUID FILLED ORAL at 08:17

## 2020-12-08 RX ADMIN — ACETAMINOPHEN 650 MG: 325 TABLET ORAL at 14:42

## 2020-12-08 ASSESSMENT — PAIN SCALES - GENERAL
PAINLEVEL_OUTOF10: 6
PAINLEVEL_OUTOF10: 4
PAINLEVEL_OUTOF10: 4
PAINLEVEL_OUTOF10: 7
PAINLEVEL_OUTOF10: 6
PAINLEVEL_OUTOF10: 6
PAINLEVEL_OUTOF10: 7

## 2020-12-08 ASSESSMENT — PAIN DESCRIPTION - DESCRIPTORS
DESCRIPTORS: PRESSURE;ACHING
DESCRIPTORS: SORE

## 2020-12-08 NOTE — PROGRESS NOTES
Pupils equal, round, and reactive to light. Conjunctivae/corneas clear. Neck: Supple, with full range of motion. No jugular venous distention. Trachea midline. Respiratory:  Normal respiratory effort. Clear to auscultation, bilaterally without Rales/Wheezes/Rhonchi. Cardiovascular: Regular rate and rhythm with normal S1/S2 without murmurs, rubs or gallops. Abdomen: Soft, non-tender, non-distended with normal bowel sounds. Musculoskeletal: No clubbing, cyanosis or edema bilaterally. Full range of motion without deformity. Skin: Skin color, texture, turgor normal.  No rashes or lesions. Neurologic:  Neurovascularly intact without any focal sensory/motor deficits. Cranial nerves: II-XII intact, grossly non-focal.  Psychiatric: Alert and oriented, thought content appropriate, normal insight  Capillary Refill: Brisk,< 3 seconds   Peripheral Pulses: +2 palpable, equal bilaterally       Labs:   Recent Labs     20  0257 20  0334 20  0600   WBC 15.1* 17.2* 11.5*   HGB 13.2 12.6 8.7*   HCT 39.4 37.5 25.3*    344 249     Recent Labs     20  1812   *   K 3.9      CO2 22   BUN 9   CREATININE 0.6   CALCIUM 8.8     No results for input(s): AST, ALT, BILIDIR, BILITOT, ALKPHOS in the last 72 hours. No results for input(s): INR in the last 72 hours. No results for input(s): Shellia Bugler in the last 72 hours. Urinalysis:      Lab Results   Component Value Date    NITRU Negative 2020    WBCUA 10 2020    BACTERIA 1+ 2020    RBCUA 38 2020    BLOODU MODERATE 2020    SPECGRAV 1.020 2020    GLUCOSEU Negative 2020       Radiology:  No orders to display           Assessment/Plan:    Active Hospital Problems    Diagnosis Date Noted    Normal labor [O80, Z37.9] 2020     #IV drug abuse with heroin  #Hepatitis C positive  #S/p  .     Plan  I will start patient on Motrin 800 mg 3 times daily with meals, scheduled Tylenol every 6 hours. Oxycodone extended release 10 mg twice daily, along with as needed oxycodone IR 5 mg every 6 hours. We will give order for IV morphine every 4 hours for now. Discussed with nursing staff to try with oral pain meds first.  She will need to follow-up as an outpatient with her PCP/GI for hepatitis C.      DVT Prophylaxis: Will recommend Lovenox once cleared by OB/GYN will defer to primary  Diet: DIET GENERAL;  Code Status: Full Code    PT/OT Eval Status: NA    Dispo -per primary team.  Patient would benefit from outpatient rehab for drug withdrawals.     This chart was likely completed using voice recognition technology and may contain unintended grammatical , phraseology,and/or punctuation errors      Mouna Turner MD

## 2020-12-08 NOTE — FLOWSHEET NOTE
Morphine PCA stopped per MD order. Discuss plan for pain management. Reviewed medications with pt. Pt agreeable at this time. Will monitor.

## 2020-12-08 NOTE — PROGRESS NOTES
POD1 RCS chorio, no PNC, Heroin use, Hep C  No c/o. No flatus. Horace reg diet. Soft, approp tender, nd; old dried blood/intact  Cont current care. Hospitalist following for pain mngt/withdrawl  SWS consulted. Fe for anemia.

## 2020-12-08 NOTE — FLOWSHEET NOTE
Pt assisted OOB with standby assist to bathroom. Pt able to void. Yolanda instruction given and demonstrated. Pt verbalized understanding of instructions.

## 2020-12-08 NOTE — FLOWSHEET NOTE
Patient requesting to see baby. She stated that she knows she cant go back except for feeds so I stated to her that I would evaluate with the nursery and find out. Spoke with the Joan COHEN RN in the nursery and she stated at this point mom could come back at anytime but feeds will be at 3p and 6p. Relayed information to mother and patient stated she will will eat and then go back to the nursery to see her baby after that.

## 2020-12-08 NOTE — FLOWSHEET NOTE
Report to [facility], new Morphine syringe placed and 1ML wasted from previous syringe verified with [facility]

## 2020-12-08 NOTE — CARE COORDINATION
Assessment:  SW met with Pt-MOB and Charlee Hills)  SW asked FOB to allow me to talk to MOB alone. SW discussed drug use - started at 15 yrs old , was clean for 11 months and relapsed in 2020. Was in treatment in [facility], from [place], [place]. Pt reports that she was trying to get into treatment(suboxne) while pregnant but everywhere she call wouldn't accept [place]. Her car was broke and couldn't get to [place] for treatment. Her father lives in [address] [facility] , [facility], [address] [place] [address] [phone]. Pt's father has guardianship of her son Alin Gallagher ( 18), he was born positive for drugs. Pt's hasn't seen Zepplin for several months ( her father doesn't allow her to come around when she is using). Pt reports that her other daughter Angela Leone was born 10/4/2013? Stillborn, when she was 15 yrs old. Pt reports that Javi(FOB) doesn't use drugs, he is aware of her drug usage only since she had baby, she told him. [facility] Address is [address] [place] [address], [place] [address]  Pt denies any domestic voilence with Seema Vaca. 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 Pt advised that she is not working or going to school. Pt has an older brother 32 who lives in [facility] and a 27 yr old sister who lives in [place], [place].  called [facility] and made report- spoke with Omar. Case to be assigned. Advised patient requesting to enter treatment upon discharge from the hospital, she would like her baby to join her in treatment if possible. Baby will be in special nursery for 10-14 more Days.     Diana Nelson \[facility] [place] [facility]\"",MSW  [phone]

## 2020-12-09 PROCEDURE — 1220000000 HC SEMI PRIVATE OB R&B

## 2020-12-09 PROCEDURE — 6370000000 HC RX 637 (ALT 250 FOR IP): Performed by: OBSTETRICS & GYNECOLOGY

## 2020-12-09 PROCEDURE — 6370000000 HC RX 637 (ALT 250 FOR IP): Performed by: INTERNAL MEDICINE

## 2020-12-09 RX ORDER — ACETAMINOPHEN 325 MG/1
650 TABLET ORAL ONCE
Status: COMPLETED | OUTPATIENT
Start: 2020-12-09 | End: 2020-12-09

## 2020-12-09 RX ORDER — IBUPROFEN 800 MG/1
800 TABLET ORAL EVERY 8 HOURS PRN
Qty: 21 TABLET | Refills: 0 | Status: SHIPPED | OUTPATIENT
Start: 2020-12-09 | End: 2020-12-16

## 2020-12-09 RX ORDER — DIPHENHYDRAMINE HCL 25 MG
25 TABLET ORAL ONCE
Status: DISCONTINUED | OUTPATIENT
Start: 2020-12-09 | End: 2020-12-09

## 2020-12-09 RX ORDER — DIPHENHYDRAMINE HCL 25 MG
25 TABLET ORAL ONCE
Status: COMPLETED | OUTPATIENT
Start: 2020-12-09 | End: 2020-12-09

## 2020-12-09 RX ORDER — OXYCODONE HCL 10 MG/1
10 TABLET, FILM COATED, EXTENDED RELEASE ORAL EVERY 12 HOURS SCHEDULED
Qty: 14 TABLET | Refills: 0 | Status: SHIPPED | OUTPATIENT
Start: 2020-12-09 | End: 2020-12-16

## 2020-12-09 RX ORDER — PSEUDOEPHEDRINE HCL 30 MG
100 TABLET ORAL 2 TIMES DAILY
Qty: 14 CAPSULE | Refills: 0 | Status: SHIPPED | OUTPATIENT
Start: 2020-12-09 | End: 2020-12-16

## 2020-12-09 RX ADMIN — ACETAMINOPHEN 650 MG: 325 TABLET ORAL at 22:00

## 2020-12-09 RX ADMIN — FAMOTIDINE 20 MG: 20 TABLET ORAL at 22:00

## 2020-12-09 RX ADMIN — OXYCODONE HYDROCHLORIDE 5 MG: 5 TABLET ORAL at 13:24

## 2020-12-09 RX ADMIN — IBUPROFEN 800 MG: 400 TABLET, FILM COATED ORAL at 17:18

## 2020-12-09 RX ADMIN — ACETAMINOPHEN 650 MG: 325 TABLET ORAL at 10:17

## 2020-12-09 RX ADMIN — DOCUSATE SODIUM 100 MG: 100 CAPSULE, LIQUID FILLED ORAL at 22:00

## 2020-12-09 RX ADMIN — FAMOTIDINE 20 MG: 20 TABLET ORAL at 10:08

## 2020-12-09 RX ADMIN — OXYCODONE HYDROCHLORIDE 10 MG: 10 TABLET, FILM COATED, EXTENDED RELEASE ORAL at 22:00

## 2020-12-09 RX ADMIN — IBUPROFEN 800 MG: 400 TABLET, FILM COATED ORAL at 07:33

## 2020-12-09 RX ADMIN — DIPHENHYDRAMINE HCL 25 MG: 25 TABLET ORAL at 22:00

## 2020-12-09 RX ADMIN — OXYCODONE HYDROCHLORIDE 10 MG: 10 TABLET, FILM COATED, EXTENDED RELEASE ORAL at 10:39

## 2020-12-09 RX ADMIN — PRENATAL VIT W/ FE FUMARATE-FA TAB 27-0.8 MG 1 TABLET: 27-0.8 TAB at 13:24

## 2020-12-09 RX ADMIN — DOCUSATE SODIUM 100 MG: 100 CAPSULE, LIQUID FILLED ORAL at 10:08

## 2020-12-09 RX ADMIN — ACETAMINOPHEN 650 MG: 325 TABLET ORAL at 17:18

## 2020-12-09 RX ADMIN — FERROUS SULFATE TAB EC 324 MG (65 MG FE EQUIVALENT) 324 MG: 324 (65 FE) TABLET DELAYED RESPONSE at 07:32

## 2020-12-09 ASSESSMENT — PAIN SCALES - GENERAL
PAINLEVEL_OUTOF10: 8
PAINLEVEL_OUTOF10: 7
PAINLEVEL_OUTOF10: 8

## 2020-12-09 NOTE — PROGRESS NOTES
Department of Obstetrics and Gynecology   Postpartum Rounds    SUBJECTIVE:  Pain is controlled with non-steroidal anti-inflammatory drugs or narcotic analgesics. The patient is tolerating regular diet. She is ambulating. Her lochia is normal. Flatus pos. OBJECTIVE:  Vital Signs: /73   Pulse 80   Temp 98.4 °F (36.9 °C) (Oral)   Resp 16   Ht 5' 4\" (1.626 m)   Wt 180 lb (81.6 kg)   SpO2 98%   Breastfeeding Unknown   BMI 30.90 kg/m²   Appearance/Psychiatric: awake, alert, cooperative, no apparent distress, appears stated age  Constitutional: The patient is well nourished. Cardiovascular: She does not have edema. Respiratory: Respiratory effort is normal.  Gastrointestinal: Soft, appropriately tender, uterine fundus is firm below umbilicus  The incision is clean, dry and intact  Extremities: nontender to palpation    LABS / IMAGING:  CBC:   Lab Results   Component Value Date    WBC 11.5 2020    RBC 2.84 2020    HGB 8.7 2020    HCT 25.3 2020    MCV 89.4 2020    MCH 30.5 2020    MCHC 34.2 2020    RDW 14.9 2020     2020    MPV 8.5 2020       ASSESSMENT:    Postoperative Day 2 s/p Urgent repeat  at 39 w for chorioamnionitis     PLAN:   1. Advance postoperative care as tolerated  2. Discharge home on Postpartum Day 3. Plan is to transition to drug rehab with social work services help. Patient still needs to switch [place] medicaid to [address] [place] [address]   3. Pt is to call  OB clinic today for PP appointment, phone number provided   4.  Postpartum instructions reviewed and all patient's Questions answered    Electronically signed by Pipe Duke MD on 2020 at 8:00 AM

## 2020-12-09 NOTE — CARE COORDINATION
Called [facility] and inquired who assisted  is   .  is Bernadine Alex [phone], called  and left message requesting a call back for discharge planning needs.

## 2020-12-09 NOTE — FLOWSHEET NOTE
Pt states the pain meds are helping with her withdrawal. She wants to get into rehab. pt states she wants to get custody of her daughter. She denies taking any street drugs while in hospital. Pt eyes appears to be \"heavy\". RN instructed importance of being honest with nurse about any drug use for her own safety. Pt states she understands.

## 2020-12-09 NOTE — PROGRESS NOTES
Hospitalist Progress Note      PCP: No primary care provider on file. Date of Admission: 2020    Chief Complaint: IVDA withdrawal management    Hospital Course: 61-year-old female with history of IV drug abuse admitted for emergent . We are following for withdrawal from drugs. Last use of heroin was on day of admission    Subjective: Postop pain is in better control. Denies any withdrawal symptoms from heroin denies any agitation, anxiety. No fever or chills. No acute event reported overnight    Medications:  Reviewed    Infusion Medications    lactated ringers      oxytocin Stopped (20 1215)    lactated ringers 125 mL/hr at 20 1126     Scheduled Medications    nicotine  1 patch Transdermal Daily    ferrous sulfate  324 mg Oral Daily with breakfast    ibuprofen  800 mg Oral TID WC    pantoprazole  40 mg Oral QAM AC    oxyCODONE  10 mg Oral 2 times per day    acetaminophen  650 mg Oral TID    famotidine  20 mg Oral BID    citric acid-sodium citrate  30 mL Oral Once    metoclopramide  10 mg Intravenous Once    sodium chloride flush  10 mL Intravenous 2 times per day    docusate sodium  100 mg Oral BID    Tdap-Dtap  0.5 mL Intramuscular Prior to discharge    prenatal vitamin  1 tablet Oral Daily     PRN Meds: oxyCODONE, calcium carbonate, oxytocin **AND** oxytocin, sodium chloride flush, lanolin, diphenhydrAMINE, ondansetron    No intake or output data in the 24 hours ending 20 1139    Physical Exam Performed:    /73   Pulse 80   Temp 98.4 °F (36.9 °C) (Oral)   Resp 16   Ht 5' 4\" (1.626 m)   Wt 180 lb (81.6 kg)   SpO2 98%   Breastfeeding Unknown   BMI 30.90 kg/m²     General appearance: No apparent distress, appears stated age and cooperative. HEENT: Pupils equal, round, and reactive to light. Conjunctivae/corneas clear. Neck: Supple, with full range of motion. No jugular venous distention. Trachea midline.   Respiratory:  Normal respiratory effort. Clear to auscultation, bilaterally without Rales/Wheezes/Rhonchi. Cardiovascular: Regular rate and rhythm with normal S1/S2 without murmurs, rubs or gallops. Abdomen: Soft, non-tender, non-distended with normal bowel sounds. Musculoskeletal: No clubbing, cyanosis or edema bilaterally. Full range of motion without deformity. Skin: Skin color, texture, turgor normal.  No rashes or lesions. Neurologic:  Neurovascularly intact without any focal sensory/motor deficits. Cranial nerves: II-XII intact, grossly non-focal.  Psychiatric: Alert and oriented, thought content appropriate, normal insight  Capillary Refill: Brisk,< 3 seconds   Peripheral Pulses: +2 palpable, equal bilaterally       Labs:   Recent Labs     20  0257 20  0334 20  0600   WBC 15.1* 17.2* 11.5*   HGB 13.2 12.6 8.7*   HCT 39.4 37.5 25.3*    344 249     Recent Labs     20  1812   *   K 3.9      CO2 22   BUN 9   CREATININE 0.6   CALCIUM 8.8     No results for input(s): AST, ALT, BILIDIR, BILITOT, ALKPHOS in the last 72 hours. No results for input(s): INR in the last 72 hours. No results for input(s): Javy Goddard in the last 72 hours. Urinalysis:      Lab Results   Component Value Date    NITRU Negative 2020    WBCUA 10 2020    BACTERIA 1+ 2020    RBCUA 38 2020    BLOODU MODERATE 2020    SPECGRAV 1.020 2020    GLUCOSEU Negative 2020       Radiology:  No orders to display           Assessment/Plan:    Active Hospital Problems    Diagnosis Date Noted    Normal labor [O80, Z37.9] 2020     #IV drug abuse with heroin  #Hepatitis C positive  #S/p  . Plan  Would recommend to continue scheduled Tylenol and Motrin. As needed oxycodone.   She will need to follow-up as an outpatient with her PCP/GI for hepatitis C.      DVT Prophylaxis: Per primary  Diet: DIET GENERAL;  Code Status: Full Code    PT/OT Eval Status: NA    Dispo -per

## 2020-12-09 NOTE — PLAN OF CARE
Problem: Fluid Volume - Imbalance:  Goal: Absence of postpartum hemorrhage signs and symptoms  Outcome: Met This Shift  Goal: Absence of imbalanced fluid volume signs and symptoms  Outcome: Met This Shift     Problem: Infection - Surgical Site:  Goal: Will show no infection signs and symptoms  Outcome: Met This Shift     Problem: Nausea/Vomiting:  Goal: Absence of nausea/vomiting  Outcome: Met This Shift     Problem: Urinary Retention:  Goal: Urinary elimination within specified parameters  Outcome: Met This Shift     Problem: Venous Thromboembolism:  Goal: Will show no signs or symptoms of venous thromboembolism  Outcome: Met This Shift  Goal: Absence of signs or symptoms of impaired coagulation  Outcome: Met This Shift

## 2020-12-09 NOTE — FLOWSHEET NOTE
[address] [place] [address] clinic given to pt to follow up in 6 weeks. Pt verbalizes understanding.

## 2020-12-09 NOTE — FLOWSHEET NOTE
Pt c/o feeling sicker, like she is with drawling, pain 8/10, all over. Roxicodone given. emotional support given. Pt stated she doesn't ever want to detox again. She plans to go to outpatient rehab.

## 2020-12-09 NOTE — PROGRESS NOTES
[facility] Department of Anesthesiology  Post-Anesthesia Note       Name:  Stanley Galloway                                         Age:  23 y.o. MRN:  [id number]     Last Vitals & Oxygen Saturation: /64   Pulse 96   Temp 36.7 °C (98 °F) (Oral)   Resp 16   Ht 5' 4\" (1.626 m)   Wt 180 lb (81.6 kg)   SpO2 98%   Breastfeeding Unknown   BMI 30.90 kg/m²   No data found.     Level of consciousness: awake, alert and oriented    Respiratory: stable     Cardiovascular: stable     Hydration: stable     PONV: stable     Post-op pain: adequate analgesia    Post-op assessment: no apparent anesthetic complications and tolerated procedure well    Complications:  none    HARINDER Shah - ALYSSIA  December 8, 2020   9:37 PM

## 2020-12-09 NOTE — DISCHARGE SUMMARY
Department of Obstetrics and Gynecology  Postpartum Discharge Summary      Admit Date: 2020    Admit Diagnosis: suspected chorioamnionitis at 39 w with h/o two prior CS, fetal intolerance of labor. No PNC, hep C pos, IV drug abuse     Discharge Date: 12/10/2020 Any delay in discharge from ordered D/C date due to  factors. Discharge Diagnoses: Urgent repeat CS under general anesthesia      Sukhi, ADVOCATE [facility] Medication Instructions [facility]:[phone]    Printed on:20 9933   Medication Information                      docusate sodium (COLACE, DULCOLAX) 100 MG CAPS  Take 100 mg by mouth 2 times daily for 7 days             ibuprofen (ADVIL;MOTRIN) 800 MG tablet  Take 1 tablet by mouth every 8 hours as needed for Pain             oxyCODONE (OXYCONTIN) 10 MG extended release tablet  Take 1 tablet by mouth every 12 hours for 7 days. Service: Obstetrics    Consults: none    Significant Diagnostic Studies: none    Postpartum complications: none     Condition at Discharge: good    Hospital Course: uncomplicated    Discharge Instructions: Activity: as tolerated    Diet: regular diet    Instructions: No intercourse and nothing in the vagina for 6 weeks. Do not drive while using pain medications. Keep any wounds clean and dry    Discharge to: Home    Disposition / Follow up:  To be seen at [facility] clinic  in 4-6 weeks for PP visit     Home Health Nurse visit within 24-48 h if qualifies     Data:  Weight 1920 g  Information for the patient's :  Asad Ricardo [[id number]]        Apgars 7/9  Information for the patient's :  Asad Ricardo [[id number]]         Stayed in NICU    Electronically signed by Addy Castrejon MD on 2020 at 11:06 AM

## 2020-12-10 VITALS
TEMPERATURE: 98.4 F | HEIGHT: 64 IN | DIASTOLIC BLOOD PRESSURE: 58 MMHG | WEIGHT: 180 LBS | OXYGEN SATURATION: 98 % | BODY MASS INDEX: 30.73 KG/M2 | HEART RATE: 71 BPM | RESPIRATION RATE: 16 BRPM | SYSTOLIC BLOOD PRESSURE: 100 MMHG

## 2020-12-10 PROCEDURE — 6370000000 HC RX 637 (ALT 250 FOR IP): Performed by: OBSTETRICS & GYNECOLOGY

## 2020-12-10 PROCEDURE — 6370000000 HC RX 637 (ALT 250 FOR IP): Performed by: INTERNAL MEDICINE

## 2020-12-10 RX ADMIN — PANTOPRAZOLE SODIUM 40 MG: 40 TABLET, DELAYED RELEASE ORAL at 09:30

## 2020-12-10 RX ADMIN — DOCUSATE SODIUM 100 MG: 100 CAPSULE, LIQUID FILLED ORAL at 09:26

## 2020-12-10 RX ADMIN — OXYCODONE HYDROCHLORIDE 10 MG: 10 TABLET, FILM COATED, EXTENDED RELEASE ORAL at 09:25

## 2020-12-10 RX ADMIN — ACETAMINOPHEN 650 MG: 325 TABLET ORAL at 09:28

## 2020-12-10 RX ADMIN — IBUPROFEN 800 MG: 400 TABLET, FILM COATED ORAL at 09:30

## 2020-12-10 RX ADMIN — PRENATAL VIT W/ FE FUMARATE-FA TAB 27-0.8 MG 1 TABLET: 27-0.8 TAB at 09:26

## 2020-12-10 RX ADMIN — FAMOTIDINE 20 MG: 20 TABLET ORAL at 09:29

## 2020-12-10 RX ADMIN — FERROUS SULFATE TAB EC 324 MG (65 MG FE EQUIVALENT) 324 MG: 324 (65 FE) TABLET DELAYED RESPONSE at 09:29

## 2020-12-10 ASSESSMENT — PAIN SCALES - GENERAL: PAINLEVEL_OUTOF10: 7

## 2020-12-10 NOTE — PROGRESS NOTES
Spoke to Fabienne-Hill with social work to report that the mother of the infant is being discharged today and wanted to verify that it was okay for mom and/or dad to come and visit the infant in the days to follow, if they choose to. Joan reported that she would discuss this with the mother's  to verify. Joan also reported that the father of the baby should only be allowed to return to see the baby if he is on the birth certificate. Will verify this with birth registry.  Joan stated that member from [address] would be picking mom up at noon today

## 2020-12-10 NOTE — FLOWSHEET NOTE
Dede OTERO called and we discussed plans for patient to be discharged. This nurse was advised that Kan Soni from [facility] was on her way in to see patient. Rona would be arranging patient's transport to the Rehab center today as an outpatient. Working with pharmacy to get non-narcotic medications for free (neda) for the patient.

## 2020-12-10 NOTE — PLAN OF CARE
Problem: Pain:  Description: Pain management should include both nonpharmacologic and pharmacologic interventions. Goal: Pain level will decrease  Description: Pain level will decrease  Outcome: Met This Shift  Note: Pain has been controlled per her behavior of sleeping this shift     Problem: Discharge Planning:  Goal: Discharged to appropriate level of care  Description: Discharged to appropriate level of care  Outcome: Met This Shift  Note: Will be discharged today and will go to rehab outpatient center.  is involved     Problem: Fluid Volume - Imbalance:  Goal: Absence of postpartum hemorrhage signs and symptoms  Description: Absence of postpartum hemorrhage signs and symptoms  Outcome: Met This Shift  Note: Bleeding has been mild with no clots or odor  Goal: Absence of imbalanced fluid volume signs and symptoms  Description: Absence of imbalanced fluid volume signs and symptoms  Outcome: Met This Shift  Note: Drinking fluids well and voiding without difficulty     Problem: Infection - Surgical Site:  Goal: Will show no infection signs and symptoms  Description: Will show no infection signs and symptoms  Outcome: Met This Shift  Note: VSS. Incisional area BIRD with steri strips in place. No redness or odor from site     Problem: Mood - Altered:  Goal: Mood stable  Description: Mood stable  Outcome: Met This Shift  Note: Has been cooperative. Has slept most of this shift. Offered no complaints. Has NOT been to nursery to see infant at all this entire shift.      Problem: Nausea/Vomiting:  Goal: Absence of nausea/vomiting  Description: Absence of nausea/vomiting  Outcome: Met This Shift     Problem: Pain - Acute:  Goal: Pain level will decrease  Description: Pain level will decrease  Outcome: Met This Shift  Note: Pain has been controlled per her behavior of sleeping this shift     Problem: Venous Thromboembolism:  Goal: Will show no signs or symptoms of venous thromboembolism  Description: Will show no signs or symptoms of venous thromboembolism  Outcome: Met This Shift  Goal: Absence of signs or symptoms of impaired coagulation  Description: Absence of signs or symptoms of impaired coagulation  Outcome: Met This Shift

## 2020-12-10 NOTE — FLOWSHEET NOTE
Postpartum teaching completed and forms signed by patient. Copy witnessed by RN and given to patient. Patient verbalized understanding of all teaching points. Prescriptions filled per meds to beds. Patient plans to follow-up with [facility] Provider as instructed. Patient verbalizes understanding of discharge instructions and denies further questions. Infant remains in SCN. Mother and SO given phone number for SCN. Mother and SO informed of need to keep ID bands on for infant ID and safety purposes while infant is in the hospital.  Patient discharged in stable condition accompanied by family/guardian.

## 2020-12-10 NOTE — CARE COORDINATION
Discharge Plan:  Patient to discharge today and be transported to treatment at [facility]. Rona Arellano(liasion for [facility]) responding to hospital to meet with patient. Rona will arrange transport. Patient's AM'F to be filled [place] due to [place]. SW available as needed.   Xander Gan [facility]  [phone]

## 2020-12-10 NOTE — PROGRESS NOTES
CLINICAL PHARMACY NOTE: MEDS TO [address] Select Patient?: No  Total # of Prescriptions Filled: 2   The following medications were delivered to the patient:  · Ibuprofen 800mg  · Docusate 100mg  Total # of Interventions Completed: 0  Time Spent (min): 30    Additional Documentation:

## 2020-12-10 NOTE — FLOWSHEET NOTE
Dl November reports that patient's ride to rehab center is on its way.   Pt will be notified when they are arriving to hospital.

## 2020-12-10 NOTE — FLOWSHEET NOTE
Bedside handoff completed. All questions answered. Orders reviewed. Patient included in discussion regarding plan of care. report given to Kenny PLAZA.

## 2020-12-10 NOTE — FLOWSHEET NOTE
Mother stated that she was going to see infant at her 2100 feeding. Told her to call when she returns to room so that her meds can be given as ordered.

## 2020-12-13 NOTE — H&P
Department of Obstetrics and Gynecology   Obstetrics History and Physical        CHIEF COMPLAINT:  contractions    HISTORY OF PRESENT ILLNESS:    Jose Elias De La Cruz  is a 23 y.o.  female at 36w0d presents with a chief complaint as above and is being admitted for   without tubal ligation    Estimated Due Date: Estimated Date of Delivery: 21    PRENATAL CARE: Complicated by: IV drug use, no prenatal care, Hep B +    PAST OB HISTORY:  OB History        3    Para   3    Term           1    AB        Living   2       SAB        TAB        Ectopic        Molar        Multiple   0    Live Births   2              Past Medical History:        Diagnosis Date    Drug abuse ([facility] [address]      Past Surgical History:        Procedure Laterality Date    ABDOMEN SURGERY       SECTION       SECTION N/A 2020     SECTION low transverse ioncision at 2:58 performed by Bigg Nobles MD at [facility] L[facility] OR     Allergies:  Patient has no known allergies.   Social History:    Social History     Socioeconomic History    Marital status: Single     Spouse name: Not on file    Number of children: Not on file    Years of education: Not on file    Highest education level: Not on file   Occupational History    Not on file   Social Needs    Financial resource strain: Not on file    Food insecurity     Worry: Not on file     Inability: Not on file    Transportation needs     Medical: Not on file     Non-medical: Not on file   Tobacco Use    Smoking status: Former Smoker     Packs/day: 0.50     Years: 6.00     Pack years: 3.00     Types: Cigarettes     Start date: 2014     Quit date: 2020     Years since quittin.6   Substance and Sexual Activity    Alcohol use: Not Currently    Drug use: Yes     Comment: Herion weekly    Sexual activity: Yes   Lifestyle    Physical activity     Days per week: Not on file     Minutes per session: Not on file    Stress: Not on file Relationships    Social connections     Talks on phone: Not on file     Gets together: Not on file     Attends Orthodox service: Not on file     Active member of club or organization: Not on file     Attends meetings of clubs or organizations: Not on file     Relationship status: Not on file    Intimate partner violence     Fear of current or ex partner: Not on file     Emotionally abused: Not on file     Physically abused: Not on file     Forced sexual activity: Not on file   Other Topics Concern    Not on file   Social History Narrative    Not on file     Family History:       Problem Relation Age of Onset    Cancer Mother     Stroke Mother      Medications Prior to Admission:  No medications prior to admission. REVIEW OF SYSTEMS:  negative     PHYSICAL EXAM:    Vitals:    20 0750 20 1630 20 2200 12/10/20 0730   BP: 119/73 116/64 119/74 (!) 100/58   Pulse: 80 108 85 71   Resp: 16 18 18 16   Temp: 98.4 °F (36.9 °C) 97.8 °F (36.6 °C) 97.8 °F (36.6 °C) 98.4 °F (36.9 °C)   TempSrc: Oral Oral Oral Oral   SpO2:       Weight:       Height:         General appearance:  awake, alert, cooperative, no apparent distress, and appears stated age  Neurologic:  Awake, alert, oriented to name, place and time.     Lungs:  No increased work of breathing, good air exchange  Abdomen:  Soft, non tender, gravid, fundal height consistent with the gestational age, EFW by Leopold's maneuvers is 5lbs  Pelvis:  Adequate pelvis  Cervix: 4cm/100/BBOW  Contraction frequency: every 3 minutes  Membranes:  Intact  Labs:   CBC:   Lab Results   Component Value Date    WBC 11.5 2020    RBC 2.84 2020    HGB 8.7 2020    HCT 25.3 2020    MCV 89.4 2020    MCH 30.5 2020    MCHC 34.2 2020    RDW 14.9 2020     2020    MPV 8.5 2020       ASSESSMENT:  35-36 wks active labor prior c/s x 2  <principal problem not specified>    PLAN: , Admit  Labor: urgent c/s

## 2020-12-17 ENCOUNTER — TELEPHONE (OUTPATIENT)
Dept: SOCIAL WORK | Age: 25
End: 2020-12-17

## 2020-12-17 NOTE — TELEPHONE ENCOUNTER
BRANDEN called [phone] to see if [facility] sign Release of information for [facility] to get information. Trying to locate [facility] regarding baby discharging from Hospital on Monday or Tuesday with either family member or [facility]. Waiting for call back.   Ashlee RIVEROW,MSW  [phone]

## 2020-12-19 ASSESSMENT — LIFESTYLE VARIABLES: SMOKING_STATUS: 1

## 2020-12-19 NOTE — ANESTHESIA PRE PROCEDURE
BP: 119/73 116/64 119/74 (!) 100/58   Pulse: 80 108 85 71   Resp: 16 18 18 16   Temp: 36.9 °C (98.4 °F) 36.6 °C (97.8 °F) 36.6 °C (97.8 °F) 36.9 °C (98.4 °F)   TempSrc: Oral Oral Oral Oral   SpO2:       Weight:       Height:                                                  BP Readings from Last 3 Encounters:   12/10/20 (!) 100/58   12/07/20 122/74   06/25/20 (!) 104/56       NPO Status: Time of last liquid consumption: 0130                        Time of last solid consumption: 0130                        Date of last liquid consumption: 12/07/20                        Date of last solid food consumption: 12/07/20    BMI:   Wt Readings from Last 3 Encounters:   12/07/20 180 lb (81.6 kg)   06/25/20 167 lb 1.7 oz (75.8 kg)     Body mass index is 30.9 kg/m². CBC:   Lab Results   Component Value Date    WBC 11.5 12/08/2020    RBC 2.84 12/08/2020    HGB 8.7 12/08/2020    HCT 25.3 12/08/2020    MCV 89.4 12/08/2020    RDW 14.9 12/08/2020     12/08/2020       CMP:   Lab Results   Component Value Date     12/07/2020    K 3.9 12/07/2020     12/07/2020    CO2 22 12/07/2020    BUN 9 12/07/2020    CREATININE 0.6 12/07/2020    GFRAA >60 12/07/2020    AGRATIO 1.3 06/25/2020    LABGLOM >60 12/07/2020    GLUCOSE 136 12/07/2020    PROT 7.0 06/25/2020    CALCIUM 8.8 12/07/2020    BILITOT 1.8 06/25/2020    ALKPHOS 116 06/25/2020    AST 81 06/25/2020     06/25/2020       POC Tests: No results for input(s): POCGLU, POCNA, POCK, POCCL, POCBUN, POCHEMO, POCHCT in the last 72 hours.     Coags: No results found for: PROTIME, INR, APTT    HCG (If Applicable): No results found for: PREGTESTUR, PREGSERUM, HCG, HCGQUANT     ABGs: No results found for: PHART, PO2ART, ZQG5XGD, LXC8RLI, BEART, S4MDHPOC     Type & Screen (If Applicable):  No results found for: LABABO, LABRH    Drug/Infectious Status (If Applicable):  No results found for: HIV, HEPCAB    COVID-19 Screening (If Applicable):   Lab Results Component Value Date    COVID19 Not Detected 2020         Anesthesia Evaluation  Patient summary reviewed and Nursing notes reviewed  Airway: Mallampati: II  TM distance: >3 FB   Neck ROM: full  Mouth opening: > = 3 FB Dental:    (+) upper dentures      Pulmonary:normal exam    (+) current smoker          Patient smoked on day of surgery. Cardiovascular:Negative CV ROS             Beta Blocker:  Not on Beta Blocker         Neuro/Psych:   Negative Neuro/Psych ROS              GI/Hepatic/Renal: Neg GI/Hepatic/Renal ROS            Endo/Other: Negative Endo/Other ROS             Pt had no PAT visit       Abdominal:           Vascular: negative vascular ROS. F     Anesthesia Plan      general     ASA 3 - emergent             Anesthetic plan and risks discussed with patient. Use of blood products discussed with patient whom consented to blood products. Plan discussed with CRNA. OB History        3    Para   3    Term           1    AB        Living   2       SAB        TAB        Ectopic        Molar        Multiple   0    Live Births   Elma Ibarra is a 23y.o. year-old female admitted to [facility] for GEN. Gestational age is 36w0d. Her Body mass index is 30.9 kg/m². She was seen, examined and her chart was reviewed (including anesthesia, drug and allergy history). No interval changes are noted to her history and physical examination. (except as noted above).     Risks/benefits/alternatives of both neuraxial and general anesthesia were discussed and she agrees to proceed at the direction of the care team.    HARINDER Martell - CRNA  2020  12:04 PM    HARINDER Martell - ALYSSIA   2020

## 2023-11-13 NOTE — FLOWSHEET NOTE
Assessment completed at this time due to the fact that meds were given to help her sleep. She and daddy of baby did not go to nursery to see infant as previously stated they would do. Mom said she had to use bathroom, which she did have a bowel movement. Incisional area intact with steri strips in place with old drainage noted. Incision is closed and intact. Bleeding is mild, independent in doing lauro care. Instructed mom to turn off lights and lay in bed and rest.  Instructed her to call with any questions or concerns. Verbalized understanding. GENERAL: NAD, well-developed.  HEAD:  Atraumatic, Normocephalic.  EYES: conjunctiva and sclera clear  CHEST/LUNG: GBAE. No wheezing or crackles   HEART: regular rate and rhythm; S1/S2.  ABDOMEN: Soft, Nontender, Nondistended  EXTREMITIES: No edema.   PSYCH: AAOx3.  NEUROLOGY: non-focal; moves all extremities GENERAL: laying comfortably in bed, lethargic sleeping post versed for Ct scan  HEAD:  Atraumatic, Normocephalic.  EYES: conjunctiva and sclera clear  CHEST/LUNG: GBAE. No wheezing or crackles   HEART: regular rate and rhythm; S1/S2.  ABDOMEN: Soft, Nontender, Nondistended  EXTREMITIES: No edema.   NEUROLOGY: unable to assess

## (undated) DEVICE — CANISTER, RIGID, 3000CC: Brand: MEDLINE INDUSTRIES, INC.

## (undated) DEVICE — 3M™ STERI-STRIP™ COMPOUND BENZOIN TINCTURE 40 BAGS/CARTON 4 CARTONS/CASE C1544: Brand: 3M™ STERI-STRIP™

## (undated) DEVICE — Device

## (undated) DEVICE — SAFESECURE,SECUREMENT,FOLEY CATH,STERILE: Brand: MEDLINE

## (undated) DEVICE — SOLUTION IV IRRIG POUR BRL 0.9% SODIUM CHL 2F7124

## (undated) DEVICE — GLOVE SURG SZ 75 L12IN FNGR THK87MIL WHT LTX FREE

## (undated) DEVICE — 9165 UNIVERSAL PATIENT PLATE: Brand: 3M™

## (undated) DEVICE — SUTURE VCRL + SZ 0 L18IN ABSRB UD L36MM CT-1 1/2 CIR VCP840D

## (undated) DEVICE — CHLORAPREP 26ML ORANGE

## (undated) DEVICE — POOLE SUCTION INSTRUMENT,RIGID: Brand: ARGYLE

## (undated) DEVICE — SPONGE LAP W18XL18IN WHT COT 4 PLY FLD STRUNG RADPQ DISP ST

## (undated) DEVICE — SUTURE MCRYL SZ 4-0 L27IN ABSRB UD L19MM PS-2 1/2 CIR PRIM Y426H

## (undated) DEVICE — 3M™ STERI-STRIP™ REINFORCED ADHESIVE SKIN CLOSURES, R1547, 1/2 IN X 4 IN (12 MM X 100 MM), 6 STRIPS/ENVELOPE: Brand: 3M™ STERI-STRIP™

## (undated) DEVICE — Device: Brand: PORTEX

## (undated) DEVICE — COVER LT HNDL BLU PLAS

## (undated) DEVICE — CATHETER TRAY 16 FR 5 CC FOL ANTIREFLX SAMPLING PRT DOVER

## (undated) DEVICE — BAG,SPONGE COUNTER,BLUE,50/BX,5BX/CS: Brand: MEDLINE